# Patient Record
Sex: MALE | Race: BLACK OR AFRICAN AMERICAN
[De-identification: names, ages, dates, MRNs, and addresses within clinical notes are randomized per-mention and may not be internally consistent; named-entity substitution may affect disease eponyms.]

---

## 2020-09-11 ENCOUNTER — HOSPITAL ENCOUNTER (OUTPATIENT)
Age: 55
End: 2020-09-11
Payer: COMMERCIAL

## 2020-09-11 DIAGNOSIS — Z03.818: Primary | ICD-10-CM

## 2020-09-11 PROCEDURE — U0003 INFECTIOUS AGENT DETECTION BY NUCLEIC ACID (DNA OR RNA); SEVERE ACUTE RESPIRATORY SYNDROME CORONAVIRUS 2 (SARS-COV-2) (CORONAVIRUS DISEASE [COVID-19]), AMPLIFIED PROBE TECHNIQUE, MAKING USE OF HIGH THROUGHPUT TECHNOLOGIES AS DESCRIBED BY CMS-2020-01-R: HCPCS

## 2020-11-24 ENCOUNTER — HOSPITAL ENCOUNTER (OUTPATIENT)
Age: 55
End: 2020-11-24
Payer: COMMERCIAL

## 2020-11-24 DIAGNOSIS — M54.2: Primary | ICD-10-CM

## 2020-11-24 PROCEDURE — 72050 X-RAY EXAM NECK SPINE 4/5VWS: CPT

## 2020-11-25 ENCOUNTER — HOSPITAL ENCOUNTER (EMERGENCY)
Age: 55
Discharge: HOME | End: 2020-11-25
Payer: COMMERCIAL

## 2020-11-25 VITALS
BODY MASS INDEX: 39.3 KG/M2 | RESPIRATION RATE: 18 BRPM | HEART RATE: 130 BPM | SYSTOLIC BLOOD PRESSURE: 190 MMHG | TEMPERATURE: 97.88 F | DIASTOLIC BLOOD PRESSURE: 124 MMHG | OXYGEN SATURATION: 94 %

## 2020-11-25 VITALS
DIASTOLIC BLOOD PRESSURE: 124 MMHG | HEART RATE: 84 BPM | TEMPERATURE: 98.2 F | RESPIRATION RATE: 15 BRPM | SYSTOLIC BLOOD PRESSURE: 196 MMHG | OXYGEN SATURATION: 94 %

## 2020-11-25 VITALS — OXYGEN SATURATION: 94 % | DIASTOLIC BLOOD PRESSURE: 122 MMHG | SYSTOLIC BLOOD PRESSURE: 185 MMHG | HEART RATE: 85 BPM

## 2020-11-25 VITALS — OXYGEN SATURATION: 94 % | SYSTOLIC BLOOD PRESSURE: 185 MMHG | DIASTOLIC BLOOD PRESSURE: 118 MMHG | HEART RATE: 89 BPM

## 2020-11-25 DIAGNOSIS — F41.9: ICD-10-CM

## 2020-11-25 DIAGNOSIS — I16.0: Primary | ICD-10-CM

## 2020-11-25 DIAGNOSIS — Z12.5: ICD-10-CM

## 2020-11-25 DIAGNOSIS — G44.209: ICD-10-CM

## 2020-11-25 LAB
ALBUMIN LEVEL: 4.5 G/DL (ref 3.5–5)
ALBUMIN/GLOB SERPL: 1.4 {RATIO} (ref 1.1–1.8)
ALP ISO SERPL-ACNC: 71 U/L (ref 38–126)
ALT SERPLBLD-CCNC: 42 U/L (ref 12–78)
ANION GAP SERPL CALC-SCNC: 13.8 MEQ/L (ref 5–15)
AST SERPL QL: 31 U/L (ref 17–59)
BILIRUBIN,TOTAL: 0.5 MG/DL (ref 0.2–1.3)
BUN SERPL-MCNC: 18 MG/DL (ref 9–20)
CALCIUM SPEC-MCNC: 9.6 MG/DL (ref 8.4–10.2)
CHLORIDE SPEC-SCNC: 106 MMOL/L (ref 98–107)
CO2 SERPL-SCNC: 25 MMOL/L (ref 22–30)
COLOR UR: YELLOW
CREAT BLD-SCNC: 1.1 MG/DL (ref 0.66–1.25)
CREATININE CLEARANCE ESTIMATED: 141 ML/MIN (ref 50–200)
ESTIMATED GLOMERULAR FILT RATE: 69 ML/MIN (ref 60–?)
GFR (AFRICAN AMERICAN): 84 ML/MIN (ref 60–?)
GLOBULIN SER CALC-MCNC: 3.3 G/DL (ref 1.3–3.2)
GLUCOSE: 107 MG/DL (ref 74–100)
HCT VFR BLD CALC: 49.4 % (ref 42–52)
HGB BLD-MCNC: 16.6 G/DL (ref 14.1–18)
MCHC RBC-ENTMCNC: 33.5 G/DL (ref 31.8–35.4)
MCV RBC: 85.6 FL (ref 80–94)
MEAN CORPUSCULAR HEMOGLOBIN: 28.7 PG (ref 27–31.2)
MICRO URNS: (no result)
MUCOUS THREADS URNS QL MICRO: (no result) /LPF
PH UR: 6 [PH] (ref 5–8.5)
PLATELET # BLD: 380 K/MM3 (ref 142–424)
POTASSIUM: 4.8 MMOL/L (ref 3.5–5.1)
PROT SERPL-MCNC: 7.8 G/DL (ref 6.3–8.2)
PROT UR STRIP-MCNC: (no result) MG/DL
RBC # BLD AUTO: 5.77 M/MM3 (ref 4.6–6.2)
RBC #/AREA URNS HPF: (no result) #/HPF (ref 0–3)
SODIUM SPEC-SCNC: 140 MMOL/L (ref 136–145)
SP GR UR: >= 1.03 (ref 1–1.03)
UROBILINOGEN UR QL: 0.2 EU/DL
WBC # BLD AUTO: 9.8 K/MM3 (ref 4.8–10.8)

## 2020-11-25 PROCEDURE — 80053 COMPREHEN METABOLIC PANEL: CPT

## 2020-11-25 PROCEDURE — 81001 URINALYSIS AUTO W/SCOPE: CPT

## 2020-11-25 PROCEDURE — 99282 EMERGENCY DEPT VISIT SF MDM: CPT

## 2020-11-25 PROCEDURE — G0103 PSA SCREENING: HCPCS

## 2020-11-25 PROCEDURE — 85025 COMPLETE CBC W/AUTO DIFF WBC: CPT

## 2022-04-11 ENCOUNTER — TELEPHONE (OUTPATIENT)
Dept: FAMILY MEDICINE CLINIC | Facility: CLINIC | Age: 57
End: 2022-04-11

## 2022-04-11 RX ORDER — HYDROCHLOROTHIAZIDE 25 MG/1
25 TABLET ORAL DAILY
Qty: 90 TABLET | Refills: 0 | Status: SHIPPED | OUTPATIENT
Start: 2022-04-11

## 2022-04-11 RX ORDER — NABUMETONE 750 MG/1
750 TABLET, FILM COATED ORAL 2 TIMES DAILY
Qty: 180 TABLET | Refills: 0 | Status: SHIPPED | OUTPATIENT
Start: 2022-04-11

## 2022-04-11 NOTE — TELEPHONE ENCOUNTER
Caller: Moses Wiseman    Relationship:   Best call back number: [phone]    Requested Prescriptions:    HYDROCHLOROTHAZIDE 25MG 1 X DAY    NABUMETONE 750 MG 1 TABLET BY MOUTH TWICE DAILY         Pharmacy where request should be sent: [id number]  [place] - [address] [phone] [facility] [phone] FX     Additional details provided by patient: WOULD LIKE TO GET 90 DAY SUPPLY     Does the patient have less than a 3 day supply:  [x] Yes  [] No    Kami Hines MA   04/11/22 14:50 EDT

## 2023-01-01 ENCOUNTER — HOSPITAL ENCOUNTER (EMERGENCY)
Age: 58
Discharge: HOME | End: 2023-01-01
Payer: COMMERCIAL

## 2023-01-01 VITALS
RESPIRATION RATE: 22 BRPM | SYSTOLIC BLOOD PRESSURE: 148 MMHG | HEART RATE: 120 BPM | OXYGEN SATURATION: 94 % | DIASTOLIC BLOOD PRESSURE: 115 MMHG | TEMPERATURE: 99.5 F

## 2023-01-01 VITALS
HEART RATE: 120 BPM | RESPIRATION RATE: 22 BRPM | OXYGEN SATURATION: 94 % | DIASTOLIC BLOOD PRESSURE: 115 MMHG | SYSTOLIC BLOOD PRESSURE: 148 MMHG | TEMPERATURE: 99.5 F

## 2023-01-01 VITALS — BODY MASS INDEX: 40.5 KG/M2

## 2023-01-01 DIAGNOSIS — B97.29: ICD-10-CM

## 2023-01-01 DIAGNOSIS — J01.90: Primary | ICD-10-CM

## 2023-01-01 PROCEDURE — 87581 M.PNEUMON DNA AMP PROBE: CPT

## 2023-01-01 PROCEDURE — 87798 DETECT AGENT NOS DNA AMP: CPT

## 2023-01-01 PROCEDURE — 99212 OFFICE O/P EST SF 10 MIN: CPT

## 2023-01-01 PROCEDURE — G0463 HOSPITAL OUTPT CLINIC VISIT: HCPCS

## 2023-01-01 PROCEDURE — U0005 INFEC AGEN DETEC AMPLI PROBE: HCPCS

## 2023-01-01 PROCEDURE — C9803 HOPD COVID-19 SPEC COLLECT: HCPCS

## 2023-01-01 PROCEDURE — 87804 INFLUENZA ASSAY W/OPTIC: CPT

## 2023-01-01 PROCEDURE — 87632 RESP VIRUS 6-11 TARGETS: CPT

## 2023-01-01 PROCEDURE — 99213 OFFICE O/P EST LOW 20 MIN: CPT

## 2023-01-01 PROCEDURE — U0003 INFECTIOUS AGENT DETECTION BY NUCLEIC ACID (DNA OR RNA); SEVERE ACUTE RESPIRATORY SYNDROME CORONAVIRUS 2 (SARS-COV-2) (CORONAVIRUS DISEASE [COVID-19]), AMPLIFIED PROBE TECHNIQUE, MAKING USE OF HIGH THROUGHPUT TECHNOLOGIES AS DESCRIBED BY CMS-2020-01-R: HCPCS

## 2023-06-17 ENCOUNTER — HOSPITAL ENCOUNTER (EMERGENCY)
Age: 58
Discharge: HOME | End: 2023-06-17
Payer: COMMERCIAL

## 2023-06-17 VITALS — BODY MASS INDEX: 42 KG/M2

## 2023-06-17 VITALS
HEART RATE: 87 BPM | SYSTOLIC BLOOD PRESSURE: 150 MMHG | RESPIRATION RATE: 18 BRPM | OXYGEN SATURATION: 98 % | TEMPERATURE: 98.6 F | DIASTOLIC BLOOD PRESSURE: 93 MMHG

## 2023-06-17 VITALS
TEMPERATURE: 98.6 F | HEART RATE: 87 BPM | DIASTOLIC BLOOD PRESSURE: 93 MMHG | SYSTOLIC BLOOD PRESSURE: 150 MMHG | RESPIRATION RATE: 18 BRPM | OXYGEN SATURATION: 98 %

## 2023-06-17 DIAGNOSIS — H66.93: Primary | ICD-10-CM

## 2023-06-17 DIAGNOSIS — J45.909: ICD-10-CM

## 2023-06-17 DIAGNOSIS — I10: ICD-10-CM

## 2023-06-17 PROCEDURE — 99214 OFFICE O/P EST MOD 30 MIN: CPT

## 2023-06-17 PROCEDURE — G0463 HOSPITAL OUTPT CLINIC VISIT: HCPCS

## 2023-06-17 PROCEDURE — 99212 OFFICE O/P EST SF 10 MIN: CPT

## 2024-08-30 ENCOUNTER — PRE-ADMISSION TESTING (OUTPATIENT)
Dept: PREADMISSION TESTING | Facility: HOSPITAL | Age: 59
End: 2024-08-30
Payer: COMMERCIAL

## 2024-08-30 ENCOUNTER — HOSPITAL ENCOUNTER (OUTPATIENT)
Dept: GENERAL RADIOLOGY | Facility: HOSPITAL | Age: 59
Discharge: HOME | End: 2024-08-30
Payer: COMMERCIAL

## 2024-08-30 VITALS — WEIGHT: 315 LBS | HEIGHT: 72 IN | BODY MASS INDEX: 42.66 KG/M2

## 2024-08-30 DIAGNOSIS — C61 PROSTATE CANCER: ICD-10-CM

## 2024-08-30 LAB
ANION GAP SERPL CALCULATED.3IONS-SCNC: 9 MMOL/L (ref 5–15)
BILIRUB UR QL STRIP: NEGATIVE
BUN SERPL-MCNC: 14 MG/DL (ref 6–20)
BUN/CREAT SERPL: 14.9 (ref 7–25)
CALCIUM SPEC-SCNC: 9.2 MG/DL (ref 8.6–10.5)
CHLORIDE SERPL-SCNC: 101 MMOL/L (ref 98–107)
CLARITY UR: CLEAR
CO2 SERPL-SCNC: 28 MMOL/L (ref 22–29)
COLOR UR: YELLOW
CREAT SERPL-MCNC: 0.94 MG/DL (ref 0.76–1.27)
DEPRECATED RDW RBC AUTO: 44.5 FL (ref 37–54)
EGFRCR SERPLBLD CKD-EPI 2021: 94 ML/MIN/1.73
ERYTHROCYTE [DISTWIDTH] IN BLOOD BY AUTOMATED COUNT: 13.6 % (ref 12.3–15.4)
GLUCOSE SERPL-MCNC: 98 MG/DL (ref 65–99)
GLUCOSE UR STRIP-MCNC: NEGATIVE MG/DL
HBA1C MFR BLD: 6.3 % (ref 4.8–5.6)
HCT VFR BLD AUTO: 50.3 % (ref 37.5–51)
HGB BLD-MCNC: 15.7 G/DL (ref 13–17.7)
HGB UR QL STRIP.AUTO: NEGATIVE
KETONES UR QL STRIP: NEGATIVE
LEUKOCYTE ESTERASE UR QL STRIP.AUTO: NEGATIVE
MCH RBC QN AUTO: 27.6 PG (ref 26.6–33)
MCHC RBC AUTO-ENTMCNC: 31.2 G/DL (ref 31.5–35.7)
MCV RBC AUTO: 88.6 FL (ref 79–97)
NITRITE UR QL STRIP: NEGATIVE
PH UR STRIP.AUTO: 6.5 [PH] (ref 5–8)
PLATELET # BLD AUTO: 321 10*3/MM3 (ref 140–450)
PMV BLD AUTO: 8.5 FL (ref 6–12)
POTASSIUM SERPL-SCNC: 4.3 MMOL/L (ref 3.5–5.2)
PROT UR QL STRIP: NEGATIVE
QT INTERVAL: 358 MS
QTC INTERVAL: 423 MS
RBC # BLD AUTO: 5.68 10*6/MM3 (ref 4.14–5.8)
SODIUM SERPL-SCNC: 138 MMOL/L (ref 136–145)
SP GR UR STRIP: 1.03 (ref 1–1.03)
UROBILINOGEN UR QL STRIP: NORMAL
WBC NRBC COR # BLD AUTO: 6.8 10*3/MM3 (ref 3.4–10.8)

## 2024-08-30 PROCEDURE — 93005 ELECTROCARDIOGRAM TRACING: CPT

## 2024-08-30 PROCEDURE — 85027 COMPLETE CBC AUTOMATED: CPT

## 2024-08-30 PROCEDURE — 80048 BASIC METABOLIC PNL TOTAL CA: CPT

## 2024-08-30 PROCEDURE — 81003 URINALYSIS AUTO W/O SCOPE: CPT

## 2024-08-30 PROCEDURE — 71046 X-RAY EXAM CHEST 2 VIEWS: CPT

## 2024-08-30 PROCEDURE — 36415 COLL VENOUS BLD VENIPUNCTURE: CPT

## 2024-08-30 PROCEDURE — 83036 HEMOGLOBIN GLYCOSYLATED A1C: CPT

## 2024-08-30 RX ORDER — SODIUM PHOSPHATE,MONO-DIBASIC 19G-7G/118
2 ENEMA (ML) RECTAL DAILY
COMMUNITY

## 2024-08-30 RX ORDER — VALSARTAN 160 MG/1
160 TABLET ORAL DAILY
COMMUNITY

## 2024-08-30 RX ORDER — LORATADINE 10 MG/1
10 TABLET ORAL DAILY
COMMUNITY

## 2024-08-30 NOTE — PAT
Left message for Dr Bustos to return call to notify him of the patient's need for cardiac clearance.  NO response.  Left message for Mayra at Dr Bustos's office to return my call.  She returned my call at 1430.  I informed Mayra that Dr Rock has requested cardiac clearance due to abnormal EKG and risk factors of BMI, HTN, ROBIN (no cpap use), and poor activity tolerance. It is not possible to have the patient seen by cardiology before 9/3/24.  Patient will need to be cancelled.  Mayra to notify the patient on his cell phone that he will be cancelled. Patient had already left PAT.  Calls were not returned while patient was still in PAT.

## 2024-08-30 NOTE — PAT
Patient to apply Chlorhexadine wipes  to surgical area (as instructed) the night before procedure and the AM of procedure. Wipes provided.    Patient instructed to drink 20 ounces of Gatorade or Gatorlyte (if diabetic) and it needs to be completed 1 hour (for Main OR patients) or 2 hours (scheduled  section & [facility] patients) before given arrival time for procedure (NO RED Gatorade and NO Gatorade Zero).    Patient verbalized understanding.    Patient directed to Radiology Department for CXR after Pre Admission Testing Appointment.     Per Anesthesia Request, patient instructed not to take their ACE/ARB medications on the AM of surgery.    Living will added to chart.     Dr rodriguez reviewed EKG and states pt needs cardiac clearance before surgery- dr ariza paged multiple times and message left for his  to call pat back-  awaiting call at this time- holding chart at this time- pt informed to hear from to office. Pt verbalize understanding.

## 2024-09-09 ENCOUNTER — PATIENT ROUNDING (BHMG ONLY) (OUTPATIENT)
Dept: CARDIOLOGY | Facility: CLINIC | Age: 59
End: 2024-09-09

## 2024-09-09 ENCOUNTER — OFFICE VISIT (OUTPATIENT)
Dept: CARDIOLOGY | Facility: CLINIC | Age: 59
End: 2024-09-09
Payer: COMMERCIAL

## 2024-09-09 VITALS
WEIGHT: 315 LBS | OXYGEN SATURATION: 92 % | HEIGHT: 72 IN | SYSTOLIC BLOOD PRESSURE: 138 MMHG | DIASTOLIC BLOOD PRESSURE: 94 MMHG | HEART RATE: 89 BPM | BODY MASS INDEX: 42.66 KG/M2

## 2024-09-09 DIAGNOSIS — R07.9 CHRONIC CHEST PAIN WITH LOW TO MODERATE RISK FOR CAD: Primary | ICD-10-CM

## 2024-09-09 DIAGNOSIS — G89.29 CHRONIC CHEST PAIN WITH LOW TO MODERATE RISK FOR CAD: Primary | ICD-10-CM

## 2024-09-09 DIAGNOSIS — Z91.89 CHRONIC CHEST PAIN WITH LOW TO MODERATE RISK FOR CAD: Primary | ICD-10-CM

## 2024-09-09 PROCEDURE — 99203 OFFICE O/P NEW LOW 30 MIN: CPT | Performed by: PHYSICIAN ASSISTANT

## 2024-09-09 PROCEDURE — 93000 ELECTROCARDIOGRAM COMPLETE: CPT | Performed by: PHYSICIAN ASSISTANT

## 2024-09-09 NOTE — PROGRESS NOTES
[facility] at [facility]  INITIAL OFFICE CONSULT      Moses Wiseman  1965  PCP: Andrea Aragon, DO    SUBJECTIVE:   Moses Wiseman is a 58 y.o. male seen for a consultation visit regarding the following:     Chief Complaint:   Chief Complaint   Patient presents with    cardiac clearance          Consultation is requested by Andrea Aragon,* for evaluation of cardiac clearance      History:  Pleasant 58-year-old gentleman retired from [employer] presents today for cardiac evaluation regarding abnormal EKG and need for upcoming prostate surgery as well as pituitary adenoma removal.  The patient denies any previous cardiac history.  He does state he has been in the wear shape of his life for the past couple years.  He has gained a tremendous amount of weight he is now over 326 pounds with his average weight few years ago being 250 pound range.  He is quite short of breath with this has difficult time doing routine physical tasks as he is so short of breath he has to stop walking frequently.  He denies any palpitation denies any near-syncope or syncope.  He notes some chest tightness with shortness of breath.  He denies heart failure symptoms like orthopnea PND pedal edema.  He states his blood pressure is controlled.  He is unsure if his cholesterol is high he does know his glucose has been elevated recently.  His EKG was performed in preadmission testing last week prior to upcoming prostate surgery this revealed nonspecific ST atrial abnormality especially in the lateral leads.      Cardiac PMH: (Old records have been reviewed and summarized below)  Abnormal KEG  Non-functioning Pituitary macroadenoma Followed by Dr Hdez.   HTN: Controlled   OA with Knees.   Sleep Apnea , CPAP  Pre DM, HbA1c 6.2   Prostate Cancer: Plan for resection.         Past Medical History, Past Surgical History, Family history, Social History, and Medications were all reviewed with  the patient today and updated as necessary.     Current Outpatient Medications   Medication Sig Dispense Refill    glucosamine-chondroitin 500-400 MG capsule capsule Take 2 capsules by mouth Daily.      loratadine (CLARITIN) 10 MG tablet Take 1 tablet by mouth Daily.      nabumetone (RELAFEN) 750 MG tablet Take 1 tablet by mouth 2 (Two) Times a Day. 180 tablet 0    NON FORMULARY Nitric oxide-  2 tablets daily      valsartan (DIOVAN) 160 MG tablet Take 1 tablet by mouth Daily.       No current facility-administered medications for this visit.     No Known Allergies      Past Medical History:   Diagnosis Date    Arthritis     bilat knee    Asthma     allergy induced    Cancer     prostate    Headache     Hypertension     Pituitary tumor     will be removed after prostate surgery    Sleep apnea     no machine used    Tinnitus     Wears glasses      Past Surgical History:   Procedure Laterality Date    MULTIPLE TOOTH EXTRACTIONS      not wisdom tooth    PROSTATE BIOPSY       Family History   Problem Relation Age of Onset    Heart attack Mother     Prostate cancer Father     Multiple sclerosis Sister     Dementia Sister     Hyperlipidemia Brother     Prostate cancer Brother     Hypertension Brother      Social History     Tobacco Use    Smoking status: Never    Smokeless tobacco: Never   Substance Use Topics    Alcohol use: Yes     Comment: rarely       ROS:  Review of Symptoms:  General: Positive fatigue  Skin: no rashes, lumps, or other skin changes  HEENT: no dizziness, lightheadedness, or vision changes  Respiratory: no cough or hemoptysis  Cardiovascular: no palpitations, and tachycardia  Gastrointestinal: no black/tarry stools or diarrhea  Urinary: no change in frequency or urgency  Peripheral Vascular: no claudication or leg cramps  Musculoskeletal: no muscle or joint pain/stiffness  Psychiatric: no depression or excessive stress  Neurological: no sensory or motor loss, no syncope  Hematologic: no anemia, easy  "bruising or bleeding  Endocrine: no thyroid problems, nor heat or cold intolerance         PHYSICAL EXAM:   /94 (BP Location: Right arm, Patient Position: Sitting, Cuff Size: Adult)   Pulse 89   Ht 182.9 cm (72\")   Wt (!) 148 kg (326 lb 9.6 oz)   SpO2 92%   BMI 44.29 kg/m²      Wt Readings from Last 5 Encounters:   09/09/24 (!) 148 kg (326 lb 9.6 oz)   08/30/24 (!) 147 kg (324 lb 15.3 oz)     BP Readings from Last 5 Encounters:   09/09/24 138/94       General-Well Nourished, Well developed  Eyes - PERRLA  Neck- supple, No mass  CV- regular rate and rhythm, no MRG  Lung- clear bilaterally  Abd- soft, +BS  Musc/skel - Norm strength and range of motion  Skin- warm and dry  Neuro - Alert & Oriented x 3, appropriate mood.    Patient's external notes were reviewed.  Independent interpretation of test performed by another physician in facility were reviewed.  Outside laboratory data was also reviewed.    Medical problems and test results were reviewed with the patient today.     Results for orders placed or performed in visit on 08/30/24   Hemoglobin A1c    Specimen: Blood   Result Value Ref Range    Hemoglobin A1C 6.30 (H) 4.80 - 5.60 %   CBC (No Diff)    Specimen: Blood   Result Value Ref Range    WBC 6.80 3.40 - 10.80 10*3/mm3    RBC 5.68 4.14 - 5.80 10*6/mm3    Hemoglobin 15.7 13.0 - 17.7 g/dL    Hematocrit 50.3 37.5 - 51.0 %    MCV 88.6 79.0 - 97.0 fL    MCH 27.6 26.6 - 33.0 pg    MCHC 31.2 (L) 31.5 - 35.7 g/dL    RDW 13.6 12.3 - 15.4 %    RDW-SD 44.5 37.0 - 54.0 fl    MPV 8.5 6.0 - 12.0 fL    Platelets 321 140 - 450 10*3/mm3   Basic Metabolic Panel    Specimen: Blood   Result Value Ref Range    Glucose 98 65 - 99 mg/dL    BUN 14 6 - 20 mg/dL    Creatinine 0.94 0.76 - 1.27 mg/dL    Sodium 138 136 - 145 mmol/L    Potassium 4.3 3.5 - 5.2 mmol/L    Chloride 101 98 - 107 mmol/L    CO2 28.0 22.0 - 29.0 mmol/L    Calcium 9.2 8.6 - 10.5 mg/dL    BUN/Creatinine Ratio 14.9 7.0 - 25.0    Anion Gap 9.0 5.0 - 15.0 " "mmol/L    eGFR 94.0 >60.0 mL/min/1.73   Urinalysis without microscopic (no culture) - Urine, Clean Catch    Specimen: Urine, Clean Catch   Result Value Ref Range    Color, UA Yellow Yellow, Straw    Appearance, UA Clear Clear    pH, UA 6.5 5.0 - 8.0    Specific Gravity, UA 1.027 1.001 - 1.030    Glucose, UA Negative Negative    Ketones, UA Negative Negative    Bilirubin, UA Negative Negative    Blood, UA Negative Negative    Protein, UA Negative Negative    Leuk Esterase, UA Negative Negative    Nitrite, UA Negative Negative    Urobilinogen, UA 1.0 E.U./dL 0.2 - 1.0 E.U./dL   ECG 12 Lead   Result Value Ref Range    QT Interval 358 ms    QTC Interval 423 ms         No results found for: \"CHOL\", \"HDL\", \"HDLC\", \"LDL\", \"LDLC\", \"VLDL\"    EKG:  (EKG/Tracing has been independently visualized by me and summarized below)      ECG 12 Lead    Date/Time: 9/9/2024 9:18 AM  Performed by: Moses Biswas PA    Authorized by: Alex Bustos Jr., MD  Comparison: not compared with previous ECG   Rhythm: sinus rhythm  Rate: normal  Conduction: conduction normal  ST Segments: ST segments normal  QRS axis: normal  Other findings: T wave abnormality    Clinical impression: non-specific ECG          ASSESSMENT   1. Abnormal EKG- Nonspecific ST-T abnormalties.  Dyspnea exertion could be Angina equivalent    2. HTN: Diovan    3. Obesity, Weight 326lbs    4. ROBIN, noncompliance with CPAP     5. Pre DM     6.  Prostate cancer chronic surgical revision and upcoming surgical revision of pituitary adenoma    7.  Cholesterol status unknown      PLAN  GXT stress test, PET scan  Echocardiogram    Cardiology/Electrophysiology  09/09/24  09:42 EDT  Electronically signed by CLARY Garcia, 09/09/24, 9:18 AM EDT.   "

## 2024-09-09 NOTE — PROGRESS NOTES
September 9, 2024    Hello, may I speak with Moses Wiseman?    My name is ISAC      I am  with [facility]  [address]  [address]  [place] [address] [phone].    Before we get started may I verify your date of birth? 1965    I am calling to officially welcome you to our practice and ask about your recent visit. Is this a good time to talk? yes    Tell me about your visit with us. What things went well?  EVERYTHIN. GETTING SURGERY CLEARANCE        We're always looking for ways to make our patients' experiences even better. Do you have recommendations on ways we may improve?  no    Overall were you satisfied with your first visit to our practice? yes       I appreciate you taking the time to speak with me today. Is there anything else I can do for you? no      Thank you, and have a great day.

## 2024-09-11 ENCOUNTER — HOSPITAL ENCOUNTER (OUTPATIENT)
Dept: CARDIOLOGY | Facility: HOSPITAL | Age: 59
Discharge: HOME | End: 2024-09-11
Payer: COMMERCIAL

## 2024-09-11 VITALS — BODY MASS INDEX: 42.66 KG/M2 | WEIGHT: 315 LBS | HEIGHT: 72 IN

## 2024-09-11 DIAGNOSIS — R07.9 CHRONIC CHEST PAIN WITH LOW TO MODERATE RISK FOR CAD: ICD-10-CM

## 2024-09-11 DIAGNOSIS — Z91.89 CHRONIC CHEST PAIN WITH LOW TO MODERATE RISK FOR CAD: ICD-10-CM

## 2024-09-11 DIAGNOSIS — G89.29 CHRONIC CHEST PAIN WITH LOW TO MODERATE RISK FOR CAD: ICD-10-CM

## 2024-09-11 LAB
ASCENDING AORTA: 4.2 CM
BH CV ECHO LEFT VENTRICLE GLOBAL LONGITUDINAL STRAIN: -25 %
BH CV ECHO MEAS - AO MAX PG: 5.8 MMHG
BH CV ECHO MEAS - AO MEAN PG: 4 MMHG
BH CV ECHO MEAS - AO ROOT DIAM: 4.3 CM
BH CV ECHO MEAS - AO V2 MAX: 120.4 CM/SEC
BH CV ECHO MEAS - AO V2 VTI: 19.1 CM
BH CV ECHO MEAS - AVA(I,D): 2.8 CM2
BH CV ECHO MEAS - CONTRAST EF 4CH: 64.5 CM2
BH CV ECHO MEAS - EF(MOD-BP): 64.5 %
BH CV ECHO MEAS - EF(MOD-SP2): 65.6 %
BH CV ECHO MEAS - EF(MOD-SP4): 64 %
BH CV ECHO MEAS - IVS/LVPW: 1 CM
BH CV ECHO MEAS - IVSD: 1.6 CM
BH CV ECHO MEAS - LA DIMENSION: 3.8 CM
BH CV ECHO MEAS - LAT PEAK E' VEL: 10 CM/SEC
BH CV ECHO MEAS - LV MAX PG: 4.8 MMHG
BH CV ECHO MEAS - LV MEAN PG: 2.4 MMHG
BH CV ECHO MEAS - LV V1 MAX: 109.5 CM/SEC
BH CV ECHO MEAS - LV V1 VTI: 23.3 CM
BH CV ECHO MEAS - LVIDD: 3 CM
BH CV ECHO MEAS - LVIDS: 2 CM
BH CV ECHO MEAS - LVOT AREA: 3.1 CM2
BH CV ECHO MEAS - LVOT DIAM: 2 CM
BH CV ECHO MEAS - LVPWD: 1.6 CM
BH CV ECHO MEAS - MED PEAK E' VEL: 7.7 CM/SEC
BH CV ECHO MEAS - MV A MAX VEL: 83.8 CM/SEC
BH CV ECHO MEAS - MV E MAX VEL: 92.3 CM/SEC
BH CV ECHO MEAS - MV E/A: 1.1
BH CV ECHO MEAS - MV MAX PG: 3.7 MMHG
BH CV ECHO MEAS - MV MEAN PG: 1.2 MMHG
BH CV ECHO MEAS - MV P1/2T: 55 MSEC
BH CV ECHO MEAS - MV V2 VTI: 23.6 CM
BH CV ECHO MEAS - MVA(P1/2T): 4 CM2
BH CV ECHO MEAS - PA ACC TIME: 0.14 SEC
BH CV ECHO MEAS - PA V2 MAX: 93.9 CM/SEC
BH CV ECHO MEAS - TAPSE (>1.6): 1.61 CM
BH CV ECHO MEASUREMENTS AVERAGE E/E' RATIO: 10.43
BH CV REST NUCLEAR ISOTOPE DOSE: 30 MCI
BH CV STRESS BP STAGE 1: NORMAL
BH CV STRESS BP STAGE 3: NORMAL
BH CV STRESS COMMENTS STAGE 1: NORMAL
BH CV STRESS DOSE REGADENOSON STAGE 1: 0.4
BH CV STRESS DURATION MIN STAGE 1: 1
BH CV STRESS DURATION MIN STAGE 2: 1
BH CV STRESS DURATION MIN STAGE 3: 1
BH CV STRESS DURATION MIN STAGE 4: 1
BH CV STRESS DURATION SEC STAGE 1: 0
BH CV STRESS DURATION SEC STAGE 2: 0
BH CV STRESS DURATION SEC STAGE 3: 0
BH CV STRESS DURATION SEC STAGE 4: 0
BH CV STRESS HR STAGE 1: 90
BH CV STRESS HR STAGE 2: 99
BH CV STRESS HR STAGE 3: 96
BH CV STRESS HR STAGE 4: 92
BH CV STRESS NUCLEAR ISOTOPE DOSE: 30 MCI
BH CV STRESS O2 STAGE 1: 94
BH CV STRESS O2 STAGE 2: 97
BH CV STRESS O2 STAGE 3: 96
BH CV STRESS O2 STAGE 4: 95
BH CV STRESS PROTOCOL 1: NORMAL
BH CV STRESS RECOVERY BP: NORMAL MMHG
BH CV STRESS RECOVERY HR: 86 BPM
BH CV STRESS RECOVERY O2: 95 %
BH CV STRESS STAGE 1: 1
BH CV STRESS STAGE 2: 2
BH CV STRESS STAGE 3: 3
BH CV STRESS STAGE 4: 4
BH CV XLRA - RV BASE: 5.1 CM
BH CV XLRA - RV LENGTH: 8.3 CM
BH CV XLRA - RV MID: 4 CM
BH CV XLRA - TDI S': 12.3 CM/SEC
LV EF NUC BP: 56 %
MAXIMAL PREDICTED HEART RATE: 162 BPM
PERCENT MAX PREDICTED HR: 63.58 %
STRESS BASELINE BP: NORMAL MMHG
STRESS BASELINE HR: 83 BPM
STRESS O2 SAT REST: 93 %
STRESS PERCENT HR: 75 %
STRESS POST ESTIMATED WORKLOAD: 1 METS
STRESS POST EXERCISE DUR MIN: 4 MIN
STRESS POST EXERCISE DUR SEC: 0 SEC
STRESS POST O2 SAT PEAK: 97 %
STRESS POST PEAK BP: NORMAL MMHG
STRESS POST PEAK HR: 103 BPM
STRESS TARGET HR: 138 BPM

## 2024-09-11 PROCEDURE — A9555 RB82 RUBIDIUM: HCPCS | Performed by: PHYSICIAN ASSISTANT

## 2024-09-11 PROCEDURE — 93018 CV STRESS TEST I&R ONLY: CPT | Performed by: INTERNAL MEDICINE

## 2024-09-11 PROCEDURE — 93306 TTE W/DOPPLER COMPLETE: CPT

## 2024-09-11 PROCEDURE — 0 RUBIDIUM CHLORIDE: Performed by: PHYSICIAN ASSISTANT

## 2024-09-11 PROCEDURE — 93356 MYOCRD STRAIN IMG SPCKL TRCK: CPT

## 2024-09-11 PROCEDURE — 78431 MYOCRD IMG PET RST&STRS CT: CPT | Performed by: INTERNAL MEDICINE

## 2024-09-11 PROCEDURE — 25010000002 REGADENOSON 0.4 MG/5ML SOLUTION: Performed by: PHYSICIAN ASSISTANT

## 2024-09-11 PROCEDURE — 93017 CV STRESS TEST TRACING ONLY: CPT

## 2024-09-11 PROCEDURE — 25010000002 SULFUR HEXAFLUORIDE MICROSPH 60.7-25 MG RECONSTITUTED SUSPENSION: Performed by: PHYSICIAN ASSISTANT

## 2024-09-11 PROCEDURE — 78431 MYOCRD IMG PET RST&STRS CT: CPT

## 2024-09-11 RX ORDER — REGADENOSON 0.08 MG/ML
0.4 INJECTION, SOLUTION INTRAVENOUS ONCE
Status: COMPLETED | OUTPATIENT
Start: 2024-09-11 | End: 2024-09-11

## 2024-09-11 RX ADMIN — SULFUR HEXAFLUORIDE 5 ML: KIT at 10:05

## 2024-09-11 RX ADMIN — RUBIDIUM CHLORIDE RB-82 1 DOSE: 150 INJECTION, SOLUTION INTRAVENOUS at 08:46

## 2024-09-11 RX ADMIN — REGADENOSON 0.4 MG: 0.08 INJECTION, SOLUTION INTRAVENOUS at 08:44

## 2024-09-11 RX ADMIN — RUBIDIUM CHLORIDE RB-82 1 DOSE: 150 INJECTION, SOLUTION INTRAVENOUS at 08:34

## 2024-09-25 ENCOUNTER — DOCUMENTATION (OUTPATIENT)
Dept: CARDIOLOGY | Facility: CLINIC | Age: 59
End: 2024-09-25
Payer: COMMERCIAL

## 2024-10-22 ENCOUNTER — PRE-ADMISSION TESTING (OUTPATIENT)
Dept: PREADMISSION TESTING | Facility: HOSPITAL | Age: 59
End: 2024-10-22
Payer: COMMERCIAL

## 2024-10-22 VITALS — BODY MASS INDEX: 42.66 KG/M2 | WEIGHT: 315 LBS | HEIGHT: 72 IN

## 2024-10-22 LAB
ANION GAP SERPL CALCULATED.3IONS-SCNC: 10 MMOL/L (ref 5–15)
BILIRUB UR QL STRIP: NEGATIVE
BUN SERPL-MCNC: 12 MG/DL (ref 6–20)
BUN/CREAT SERPL: 13.5 (ref 7–25)
CALCIUM SPEC-SCNC: 9.2 MG/DL (ref 8.6–10.5)
CHLORIDE SERPL-SCNC: 102 MMOL/L (ref 98–107)
CLARITY UR: CLEAR
CO2 SERPL-SCNC: 28 MMOL/L (ref 22–29)
COLOR UR: YELLOW
CREAT SERPL-MCNC: 0.89 MG/DL (ref 0.76–1.27)
DEPRECATED RDW RBC AUTO: 45.1 FL (ref 37–54)
EGFRCR SERPLBLD CKD-EPI 2021: 98.7 ML/MIN/1.73
ERYTHROCYTE [DISTWIDTH] IN BLOOD BY AUTOMATED COUNT: 14.2 % (ref 12.3–15.4)
GLUCOSE SERPL-MCNC: 98 MG/DL (ref 65–99)
GLUCOSE UR STRIP-MCNC: NEGATIVE MG/DL
HBA1C MFR BLD: 6.4 % (ref 4.8–5.6)
HCT VFR BLD AUTO: 50.5 % (ref 37.5–51)
HGB BLD-MCNC: 15.9 G/DL (ref 13–17.7)
HGB UR QL STRIP.AUTO: NEGATIVE
KETONES UR QL STRIP: NEGATIVE
LEUKOCYTE ESTERASE UR QL STRIP.AUTO: NEGATIVE
MCH RBC QN AUTO: 27.9 PG (ref 26.6–33)
MCHC RBC AUTO-ENTMCNC: 31.5 G/DL (ref 31.5–35.7)
MCV RBC AUTO: 88.8 FL (ref 79–97)
NITRITE UR QL STRIP: NEGATIVE
PH UR STRIP.AUTO: 6.5 [PH] (ref 5–8)
PLATELET # BLD AUTO: 344 10*3/MM3 (ref 140–450)
PMV BLD AUTO: 8.5 FL (ref 6–12)
POTASSIUM SERPL-SCNC: 4.3 MMOL/L (ref 3.5–5.2)
PROT UR QL STRIP: ABNORMAL
RBC # BLD AUTO: 5.69 10*6/MM3 (ref 4.14–5.8)
SODIUM SERPL-SCNC: 140 MMOL/L (ref 136–145)
SP GR UR STRIP: 1.02 (ref 1–1.03)
UROBILINOGEN UR QL STRIP: ABNORMAL
WBC NRBC COR # BLD AUTO: 7.39 10*3/MM3 (ref 3.4–10.8)

## 2024-10-22 PROCEDURE — 80048 BASIC METABOLIC PNL TOTAL CA: CPT

## 2024-10-22 PROCEDURE — 36415 COLL VENOUS BLD VENIPUNCTURE: CPT

## 2024-10-22 PROCEDURE — 85027 COMPLETE CBC AUTOMATED: CPT

## 2024-10-22 PROCEDURE — 83036 HEMOGLOBIN GLYCOSYLATED A1C: CPT

## 2024-10-22 PROCEDURE — 81003 URINALYSIS AUTO W/O SCOPE: CPT

## 2024-10-22 NOTE — PAT
An arrival time for procedure was not provided during PAT visit. If patient had any questions or concerns about their arrival time, they were instructed to contact their surgeon/physician.  Additionally, if the patient referred to an arrival time that was acquired from their my chart account, patient was encouraged to verify that time with their surgeon/physician. Arrival times are NOT provided in Pre Admission Testing Department.    Patient to apply Chlorhexadine wipes  to surgical area (as instructed) the night before procedure and the AM of procedure. Wipes provided.    Patient instructed to drink 20 ounces of Gatorade or Gatorlyte (if diabetic) and it needs to be completed 1 hour (for Main OR patients) or 2 hours (scheduled  section & [facility] patients) before given arrival time for procedure (NO RED Gatorade and NO Gatorade Zero).    Patient verbalized understanding.    Patient viewed general PAT education video as instructed in their preoperative information received from their surgeon.  Patient stated the general PAT education video was viewed in its entirety and survey completed.  Copies of PAT general education handouts (Incentive Spirometry, Meds to Beds Program, Patient Belongings, Pre-op skin preparation instructions, Blood Glucose testing, Visitor policy, Surgery FAQ, Code H) distributed to patient if not printed. Education related to the PAT pass and skin preparation for surgery (if applicable) completed in PAT as a reinforcement to PAT education video. Patient instructed to return PAT pass provided today as well as completed skin preparation sheet (if applicable) on the day of procedure.     Additionally if patient had not viewed video yet but intended to view it at home or in our waiting area, then referred them to the handout with QR code/link provided during PAT visit.  Encouraged patient/family to read PAT general education handouts thoroughly and notify PAT staff with any questions or  concerns. Patient verbalized understanding of all information and priority content.    Cardiac clearance and EKG in chart.

## 2024-10-28 ENCOUNTER — ANESTHESIA EVENT (OUTPATIENT)
Dept: PERIOP | Facility: HOSPITAL | Age: 59
End: 2024-10-28
Payer: COMMERCIAL

## 2024-10-28 RX ORDER — SODIUM CHLORIDE 0.9 % (FLUSH) 0.9 %
10 SYRINGE (ML) INJECTION EVERY 12 HOURS SCHEDULED
Status: CANCELLED | OUTPATIENT
Start: 2024-10-28

## 2024-10-28 RX ORDER — FAMOTIDINE 10 MG/ML
20 INJECTION, SOLUTION INTRAVENOUS ONCE
Status: CANCELLED | OUTPATIENT
Start: 2024-10-28 | End: 2024-10-28

## 2024-10-28 RX ORDER — SODIUM CHLORIDE 0.9 % (FLUSH) 0.9 %
10 SYRINGE (ML) INJECTION AS NEEDED
Status: CANCELLED | OUTPATIENT
Start: 2024-10-28

## 2024-10-29 ENCOUNTER — ANESTHESIA (OUTPATIENT)
Dept: PERIOP | Facility: HOSPITAL | Age: 59
End: 2024-10-29
Payer: COMMERCIAL

## 2024-10-29 ENCOUNTER — HOSPITAL ENCOUNTER (OUTPATIENT)
Facility: HOSPITAL | Age: 59
Discharge: HOME | End: 2024-11-01
Attending: UROLOGY | Admitting: UROLOGY
Payer: COMMERCIAL

## 2024-10-29 ENCOUNTER — ANESTHESIA EVENT CONVERTED (OUTPATIENT)
Dept: ANESTHESIOLOGY | Facility: HOSPITAL | Age: 59
End: 2024-10-29
Payer: COMMERCIAL

## 2024-10-29 DIAGNOSIS — C61 PROSTATE CANCER: Primary | ICD-10-CM

## 2024-10-29 DIAGNOSIS — E66.01 MORBID OBESITY: ICD-10-CM

## 2024-10-29 PROBLEM — I10 HTN (HYPERTENSION): Status: ACTIVE | Noted: 2024-10-29

## 2024-10-29 PROBLEM — G47.33 OSA (OBSTRUCTIVE SLEEP APNEA): Status: ACTIVE | Noted: 2024-10-29

## 2024-10-29 PROBLEM — Z90.79 S/P PROSTATECTOMY: Status: ACTIVE | Noted: 2024-10-29

## 2024-10-29 PROCEDURE — 25010000002 DEXAMETHASONE PER 1 MG

## 2024-10-29 PROCEDURE — 25010000002 DEXAMETHASONE SODIUM PHOSPHATE 10 MG/ML SOLUTION

## 2024-10-29 PROCEDURE — 25010000002 FENTANYL CITRATE (PF) 50 MCG/ML SOLUTION

## 2024-10-29 PROCEDURE — 25810000003 LACTATED RINGERS PER 1000 ML: Performed by: STUDENT IN AN ORGANIZED HEALTH CARE EDUCATION/TRAINING PROGRAM

## 2024-10-29 PROCEDURE — 25010000002 LABETALOL 5 MG/ML SOLUTION

## 2024-10-29 PROCEDURE — 88307 TISSUE EXAM BY PATHOLOGIST: CPT | Performed by: UROLOGY

## 2024-10-29 PROCEDURE — 94799 UNLISTED PULMONARY SVC/PX: CPT

## 2024-10-29 PROCEDURE — 88309 TISSUE EXAM BY PATHOLOGIST: CPT | Performed by: UROLOGY

## 2024-10-29 PROCEDURE — G0378 HOSPITAL OBSERVATION PER HR: HCPCS

## 2024-10-29 PROCEDURE — 25010000002 FENTANYL CITRATE (PF) 100 MCG/2ML SOLUTION

## 2024-10-29 PROCEDURE — 25010000002 LIDOCAINE PF 1% 1 % SOLUTION: Performed by: STUDENT IN AN ORGANIZED HEALTH CARE EDUCATION/TRAINING PROGRAM

## 2024-10-29 PROCEDURE — 63710000001 ONDANSETRON ODT 4 MG TABLET DISPERSIBLE: Performed by: UROLOGY

## 2024-10-29 PROCEDURE — 25010000002 LABETALOL 5 MG/ML SOLUTION: Performed by: INTERNAL MEDICINE

## 2024-10-29 PROCEDURE — 94660 CPAP INITIATION&MGMT: CPT

## 2024-10-29 PROCEDURE — 25010000002 CEFOXITIN PER 1 G: Performed by: UROLOGY

## 2024-10-29 PROCEDURE — 25010000002 HYDROMORPHONE 1 MG/ML SOLUTION

## 2024-10-29 PROCEDURE — 25010000002 SUGAMMADEX 500 MG/5ML SOLUTION

## 2024-10-29 PROCEDURE — 25010000002 ONDANSETRON PER 1 MG

## 2024-10-29 PROCEDURE — 25010000002 MIDAZOLAM PER 1 MG: Performed by: STUDENT IN AN ORGANIZED HEALTH CARE EDUCATION/TRAINING PROGRAM

## 2024-10-29 PROCEDURE — 25810000003 SODIUM CHLORIDE PER 500 ML: Performed by: UROLOGY

## 2024-10-29 PROCEDURE — 25010000002 PROPOFOL 10 MG/ML EMULSION

## 2024-10-29 PROCEDURE — 25010000002 LIDOCAINE PF 1% 1 % SOLUTION

## 2024-10-29 PROCEDURE — 25010000002 SODIUM CHLORIDE 0.9 % WITH KCL 20 MEQ 20-0.9 MEQ/L-% SOLUTION: Performed by: UROLOGY

## 2024-10-29 PROCEDURE — 25010000002 BUPIVACAINE (PF) 0.25 % SOLUTION

## 2024-10-29 DEVICE — GRFT AMNIO UMB CORD PRO3C THICK 3X6CM: Type: IMPLANTABLE DEVICE | Site: PROSTATE | Status: FUNCTIONAL

## 2024-10-29 RX ORDER — HYDROMORPHONE HYDROCHLORIDE 1 MG/ML
0.5 INJECTION, SOLUTION INTRAMUSCULAR; INTRAVENOUS; SUBCUTANEOUS
Status: DISCONTINUED | OUTPATIENT
Start: 2024-10-29 | End: 2024-11-01 | Stop reason: HOSPADM

## 2024-10-29 RX ORDER — HYDROMORPHONE HYDROCHLORIDE 1 MG/ML
0.5 INJECTION, SOLUTION INTRAMUSCULAR; INTRAVENOUS; SUBCUTANEOUS
Status: DISCONTINUED | OUTPATIENT
Start: 2024-10-29 | End: 2024-10-29 | Stop reason: HOSPADM

## 2024-10-29 RX ORDER — PROPOFOL 10 MG/ML
VIAL (ML) INTRAVENOUS AS NEEDED
Status: DISCONTINUED | OUTPATIENT
Start: 2024-10-29 | End: 2024-10-29 | Stop reason: SURG

## 2024-10-29 RX ORDER — GABAPENTIN 300 MG/1
600 CAPSULE ORAL ONCE
Status: COMPLETED | OUTPATIENT
Start: 2024-10-29 | End: 2024-10-29

## 2024-10-29 RX ORDER — MIDAZOLAM HYDROCHLORIDE 1 MG/ML
1 INJECTION, SOLUTION INTRAMUSCULAR; INTRAVENOUS
Status: DISCONTINUED | OUTPATIENT
Start: 2024-10-29 | End: 2024-10-29 | Stop reason: HOSPADM

## 2024-10-29 RX ORDER — SCOLOPAMINE TRANSDERMAL SYSTEM 1 MG/1
1 PATCH, EXTENDED RELEASE TRANSDERMAL CONTINUOUS
Status: DISPENSED | OUTPATIENT
Start: 2024-10-29 | End: 2024-11-01

## 2024-10-29 RX ORDER — DEXAMETHASONE SODIUM PHOSPHATE 4 MG/ML
INJECTION, SOLUTION INTRA-ARTICULAR; INTRALESIONAL; INTRAMUSCULAR; INTRAVENOUS; SOFT TISSUE AS NEEDED
Status: DISCONTINUED | OUTPATIENT
Start: 2024-10-29 | End: 2024-10-29 | Stop reason: SURG

## 2024-10-29 RX ORDER — LABETALOL HYDROCHLORIDE 5 MG/ML
10 INJECTION, SOLUTION INTRAVENOUS ONCE
Status: COMPLETED | OUTPATIENT
Start: 2024-10-29 | End: 2024-10-29

## 2024-10-29 RX ORDER — OXYCODONE HYDROCHLORIDE 5 MG/1
5 TABLET ORAL EVERY 4 HOURS PRN
Status: DISCONTINUED | OUTPATIENT
Start: 2024-10-29 | End: 2024-11-01 | Stop reason: HOSPADM

## 2024-10-29 RX ORDER — LABETALOL HYDROCHLORIDE 5 MG/ML
INJECTION, SOLUTION INTRAVENOUS
Status: COMPLETED
Start: 2024-10-29 | End: 2024-10-29

## 2024-10-29 RX ORDER — POLYETHYLENE GLYCOL 3350 17 G/17G
17 POWDER, FOR SOLUTION ORAL DAILY
Qty: 30 PACKET | Refills: 0 | Status: SHIPPED | OUTPATIENT
Start: 2024-10-29

## 2024-10-29 RX ORDER — FENTANYL CITRATE 50 UG/ML
INJECTION, SOLUTION INTRAMUSCULAR; INTRAVENOUS AS NEEDED
Status: DISCONTINUED | OUTPATIENT
Start: 2024-10-29 | End: 2024-10-29 | Stop reason: SURG

## 2024-10-29 RX ORDER — DOCUSATE SODIUM 100 MG/1
100 CAPSULE, LIQUID FILLED ORAL DAILY PRN
Qty: 30 CAPSULE | Refills: 1 | Status: SHIPPED | OUTPATIENT
Start: 2024-10-29 | End: 2025-10-29

## 2024-10-29 RX ORDER — DROPERIDOL 2.5 MG/ML
0.62 INJECTION, SOLUTION INTRAMUSCULAR; INTRAVENOUS ONCE AS NEEDED
Status: DISCONTINUED | OUTPATIENT
Start: 2024-10-29 | End: 2024-10-29 | Stop reason: HOSPADM

## 2024-10-29 RX ORDER — NITROFURANTOIN 25; 75 MG/1; MG/1
100 CAPSULE ORAL 2 TIMES DAILY
Qty: 14 CAPSULE | Refills: 0 | Status: SHIPPED | OUTPATIENT
Start: 2024-10-29

## 2024-10-29 RX ORDER — ONDANSETRON 4 MG/1
4 TABLET, ORALLY DISINTEGRATING ORAL EVERY 6 HOURS PRN
Status: DISCONTINUED | OUTPATIENT
Start: 2024-10-29 | End: 2024-11-01 | Stop reason: HOSPADM

## 2024-10-29 RX ORDER — FLUTICASONE PROPIONATE 50 MCG
2 SPRAY, SUSPENSION (ML) NASAL DAILY
COMMUNITY

## 2024-10-29 RX ORDER — LIDOCAINE HYDROCHLORIDE 10 MG/ML
0.5 INJECTION, SOLUTION EPIDURAL; INFILTRATION; INTRACAUDAL; PERINEURAL ONCE AS NEEDED
Status: COMPLETED | OUTPATIENT
Start: 2024-10-29 | End: 2024-10-29

## 2024-10-29 RX ORDER — FAMOTIDINE 20 MG/1
20 TABLET, FILM COATED ORAL ONCE
Status: COMPLETED | OUTPATIENT
Start: 2024-10-29 | End: 2024-10-29

## 2024-10-29 RX ORDER — LIDOCAINE HYDROCHLORIDE 10 MG/ML
INJECTION, SOLUTION EPIDURAL; INFILTRATION; INTRACAUDAL; PERINEURAL AS NEEDED
Status: DISCONTINUED | OUTPATIENT
Start: 2024-10-29 | End: 2024-10-29 | Stop reason: SURG

## 2024-10-29 RX ORDER — FENTANYL CITRATE 50 UG/ML
50 INJECTION, SOLUTION INTRAMUSCULAR; INTRAVENOUS
Status: DISCONTINUED | OUTPATIENT
Start: 2024-10-29 | End: 2024-10-29 | Stop reason: HOSPADM

## 2024-10-29 RX ORDER — SODIUM CHLORIDE AND POTASSIUM CHLORIDE 150; 900 MG/100ML; MG/100ML
100 INJECTION, SOLUTION INTRAVENOUS CONTINUOUS
Status: DISCONTINUED | OUTPATIENT
Start: 2024-10-29 | End: 2024-10-31

## 2024-10-29 RX ORDER — ONDANSETRON 2 MG/ML
INJECTION INTRAMUSCULAR; INTRAVENOUS AS NEEDED
Status: DISCONTINUED | OUTPATIENT
Start: 2024-10-29 | End: 2024-10-29 | Stop reason: SURG

## 2024-10-29 RX ORDER — PANTOPRAZOLE SODIUM 40 MG/1
40 TABLET, DELAYED RELEASE ORAL
Status: DISCONTINUED | OUTPATIENT
Start: 2024-10-30 | End: 2024-11-01 | Stop reason: HOSPADM

## 2024-10-29 RX ORDER — LABETALOL HYDROCHLORIDE 5 MG/ML
10 INJECTION, SOLUTION INTRAVENOUS EVERY 4 HOURS PRN
Status: DISCONTINUED | OUTPATIENT
Start: 2024-10-29 | End: 2024-11-01 | Stop reason: HOSPADM

## 2024-10-29 RX ORDER — BUPIVACAINE HYDROCHLORIDE 2.5 MG/ML
INJECTION, SOLUTION EPIDURAL; INFILTRATION; INTRACAUDAL
Status: COMPLETED | OUTPATIENT
Start: 2024-10-29 | End: 2024-10-29

## 2024-10-29 RX ORDER — VALSARTAN 160 MG/1
160 TABLET ORAL DAILY
Status: DISCONTINUED | OUTPATIENT
Start: 2024-10-29 | End: 2024-11-01 | Stop reason: HOSPADM

## 2024-10-29 RX ORDER — ACETAMINOPHEN 160 MG/5ML
650 SOLUTION ORAL EVERY 6 HOURS
Status: ACTIVE | OUTPATIENT
Start: 2024-10-29 | End: 2024-10-31

## 2024-10-29 RX ORDER — CETIRIZINE HYDROCHLORIDE 10 MG/1
5 TABLET ORAL DAILY
Status: DISCONTINUED | OUTPATIENT
Start: 2024-10-29 | End: 2024-11-01 | Stop reason: HOSPADM

## 2024-10-29 RX ORDER — ENOXAPARIN SODIUM 100 MG/ML
40 INJECTION SUBCUTANEOUS EVERY 12 HOURS
Status: DISCONTINUED | OUTPATIENT
Start: 2024-10-30 | End: 2024-11-01 | Stop reason: HOSPADM

## 2024-10-29 RX ORDER — ONDANSETRON 2 MG/ML
4 INJECTION INTRAMUSCULAR; INTRAVENOUS EVERY 6 HOURS PRN
Status: DISCONTINUED | OUTPATIENT
Start: 2024-10-29 | End: 2024-11-01 | Stop reason: HOSPADM

## 2024-10-29 RX ORDER — SODIUM CHLORIDE, SODIUM LACTATE, POTASSIUM CHLORIDE, CALCIUM CHLORIDE 600; 310; 30; 20 MG/100ML; MG/100ML; MG/100ML; MG/100ML
9 INJECTION, SOLUTION INTRAVENOUS CONTINUOUS
Status: ACTIVE | OUTPATIENT
Start: 2024-10-30 | End: 2024-10-30

## 2024-10-29 RX ORDER — GABAPENTIN 100 MG/1
100 CAPSULE ORAL 3 TIMES DAILY
Status: DISPENSED | OUTPATIENT
Start: 2024-10-29 | End: 2024-10-31

## 2024-10-29 RX ORDER — LIDOCAINE HYDROCHLORIDE 20 MG/ML
JELLY TOPICAL AS NEEDED
Status: DISCONTINUED | OUTPATIENT
Start: 2024-10-29 | End: 2024-10-29 | Stop reason: HOSPADM

## 2024-10-29 RX ORDER — ACETAMINOPHEN 325 MG/1
650 TABLET ORAL EVERY 6 HOURS
Status: DISPENSED | OUTPATIENT
Start: 2024-10-29 | End: 2024-10-31

## 2024-10-29 RX ORDER — SODIUM CHLORIDE 9 MG/ML
INJECTION, SOLUTION INTRAVENOUS AS NEEDED
Status: DISCONTINUED | OUTPATIENT
Start: 2024-10-29 | End: 2024-10-29 | Stop reason: HOSPADM

## 2024-10-29 RX ORDER — ROCURONIUM BROMIDE 10 MG/ML
INJECTION, SOLUTION INTRAVENOUS AS NEEDED
Status: DISCONTINUED | OUTPATIENT
Start: 2024-10-29 | End: 2024-10-29 | Stop reason: SURG

## 2024-10-29 RX ORDER — HYDROCODONE BITARTRATE AND ACETAMINOPHEN 7.5; 325 MG/1; MG/1
1 TABLET ORAL EVERY 6 HOURS PRN
Qty: 30 TABLET | Refills: 0 | Status: SHIPPED | OUTPATIENT
Start: 2024-10-29

## 2024-10-29 RX ORDER — BUPIVACAINE HCL/0.9 % NACL/PF 0.125 %
PLASTIC BAG, INJECTION (ML) EPIDURAL AS NEEDED
Status: DISCONTINUED | OUTPATIENT
Start: 2024-10-29 | End: 2024-10-29 | Stop reason: SURG

## 2024-10-29 RX ORDER — NALOXONE HCL 0.4 MG/ML
0.1 VIAL (ML) INJECTION
Status: DISCONTINUED | OUTPATIENT
Start: 2024-10-29 | End: 2024-11-01 | Stop reason: HOSPADM

## 2024-10-29 RX ORDER — DOCUSATE SODIUM 100 MG/1
100 CAPSULE, LIQUID FILLED ORAL 2 TIMES DAILY PRN
Status: DISCONTINUED | OUTPATIENT
Start: 2024-10-29 | End: 2024-11-01 | Stop reason: HOSPADM

## 2024-10-29 RX ORDER — ACETAMINOPHEN 500 MG
1000 TABLET ORAL ONCE
Status: COMPLETED | OUTPATIENT
Start: 2024-10-29 | End: 2024-10-29

## 2024-10-29 RX ORDER — MELOXICAM 15 MG/1
15 TABLET ORAL ONCE
Status: COMPLETED | OUTPATIENT
Start: 2024-10-29 | End: 2024-10-29

## 2024-10-29 RX ORDER — DEXAMETHASONE SODIUM PHOSPHATE 10 MG/ML
INJECTION, SOLUTION INTRAMUSCULAR; INTRAVENOUS
Status: COMPLETED | OUTPATIENT
Start: 2024-10-29 | End: 2024-10-29

## 2024-10-29 RX ORDER — FENTANYL CITRATE 50 UG/ML
INJECTION, SOLUTION INTRAMUSCULAR; INTRAVENOUS
Status: COMPLETED
Start: 2024-10-29 | End: 2024-10-29

## 2024-10-29 RX ORDER — FIBRINOGEN HUMAN, HUMAN THROMBIN 4 ML
KIT TOPICAL AS NEEDED
Status: DISCONTINUED | OUTPATIENT
Start: 2024-10-29 | End: 2024-10-29 | Stop reason: HOSPADM

## 2024-10-29 RX ORDER — ACETAMINOPHEN 650 MG/1
650 SUPPOSITORY RECTAL EVERY 6 HOURS
Status: ACTIVE | OUTPATIENT
Start: 2024-10-29 | End: 2024-10-31

## 2024-10-29 RX ADMIN — SUGAMMADEX 400 MG: 100 INJECTION, SOLUTION INTRAVENOUS at 13:53

## 2024-10-29 RX ADMIN — HYDROMORPHONE HYDROCHLORIDE 0.5 MG: 1 INJECTION, SOLUTION INTRAMUSCULAR; INTRAVENOUS; SUBCUTANEOUS at 14:55

## 2024-10-29 RX ADMIN — CEFOXITIN SODIUM 2000 MG: 2 POWDER, FOR SOLUTION INTRAVENOUS at 12:26

## 2024-10-29 RX ADMIN — FENTANYL CITRATE 50 MCG: 50 INJECTION, SOLUTION INTRAMUSCULAR; INTRAVENOUS at 14:34

## 2024-10-29 RX ADMIN — BUPIVACAINE HYDROCHLORIDE 60 ML: 2.5 INJECTION, SOLUTION EPIDURAL; INFILTRATION; INTRACAUDAL; PERINEURAL at 12:21

## 2024-10-29 RX ADMIN — Medication 10 MG: at 20:32

## 2024-10-29 RX ADMIN — LABETALOL HYDROCHLORIDE 10 MG: 5 INJECTION, SOLUTION INTRAVENOUS at 14:56

## 2024-10-29 RX ADMIN — MIDAZOLAM HYDROCHLORIDE 1 MG: 1 INJECTION, SOLUTION INTRAMUSCULAR; INTRAVENOUS at 11:51

## 2024-10-29 RX ADMIN — FAMOTIDINE 20 MG: 20 TABLET, FILM COATED ORAL at 09:58

## 2024-10-29 RX ADMIN — ACETAMINOPHEN 1000 MG: 500 TABLET ORAL at 09:58

## 2024-10-29 RX ADMIN — DEXAMETHASONE SODIUM PHOSPHATE 4 MG: 4 INJECTION INTRA-ARTICULAR; INTRALESIONAL; INTRAMUSCULAR; INTRAVENOUS; SOFT TISSUE at 12:26

## 2024-10-29 RX ADMIN — GABAPENTIN 600 MG: 300 CAPSULE ORAL at 09:58

## 2024-10-29 RX ADMIN — Medication 100 MCG: at 13:30

## 2024-10-29 RX ADMIN — Medication 100 MCG: at 12:38

## 2024-10-29 RX ADMIN — LIDOCAINE HYDROCHLORIDE 0.5 ML: 10 INJECTION, SOLUTION EPIDURAL; INFILTRATION; INTRACAUDAL; PERINEURAL at 09:58

## 2024-10-29 RX ADMIN — VALSARTAN 160 MG: 160 TABLET, FILM COATED ORAL at 20:38

## 2024-10-29 RX ADMIN — MELOXICAM 15 MG: 15 TABLET ORAL at 09:58

## 2024-10-29 RX ADMIN — GABAPENTIN 100 MG: 100 CAPSULE ORAL at 20:38

## 2024-10-29 RX ADMIN — SCOPOLAMINE 1 PATCH: 1.5 PATCH, EXTENDED RELEASE TRANSDERMAL at 09:59

## 2024-10-29 RX ADMIN — ROCURONIUM BROMIDE 5 MG: 10 INJECTION INTRAVENOUS at 12:17

## 2024-10-29 RX ADMIN — ONDANSETRON 4 MG: 4 TABLET, ORALLY DISINTEGRATING ORAL at 23:02

## 2024-10-29 RX ADMIN — OXYCODONE HYDROCHLORIDE 5 MG: 5 TABLET ORAL at 20:38

## 2024-10-29 RX ADMIN — Medication 100 MCG: at 12:33

## 2024-10-29 RX ADMIN — CEFOXITIN SODIUM 1000 MG: 1 POWDER, FOR SOLUTION INTRAVENOUS at 20:32

## 2024-10-29 RX ADMIN — Medication 100 MCG: at 13:27

## 2024-10-29 RX ADMIN — SODIUM CHLORIDE, POTASSIUM CHLORIDE, SODIUM LACTATE AND CALCIUM CHLORIDE: 600; 310; 30; 20 INJECTION, SOLUTION INTRAVENOUS at 12:10

## 2024-10-29 RX ADMIN — Medication 100 MCG: at 13:39

## 2024-10-29 RX ADMIN — ACETAMINOPHEN 650 MG: 325 TABLET ORAL at 21:22

## 2024-10-29 RX ADMIN — LIDOCAINE HYDROCHLORIDE 50 MG: 10 INJECTION, SOLUTION EPIDURAL; INFILTRATION; INTRACAUDAL; PERINEURAL at 12:17

## 2024-10-29 RX ADMIN — FENTANYL CITRATE 100 MCG: 50 INJECTION, SOLUTION INTRAMUSCULAR; INTRAVENOUS at 12:17

## 2024-10-29 RX ADMIN — HYDROMORPHONE HYDROCHLORIDE 0.5 MG: 1 INJECTION, SOLUTION INTRAMUSCULAR; INTRAVENOUS; SUBCUTANEOUS at 14:47

## 2024-10-29 RX ADMIN — Medication 100 MCG: at 13:44

## 2024-10-29 RX ADMIN — PROPOFOL 200 MG: 10 INJECTION, EMULSION INTRAVENOUS at 12:17

## 2024-10-29 RX ADMIN — ROCURONIUM BROMIDE 95 MG: 10 INJECTION INTRAVENOUS at 12:34

## 2024-10-29 RX ADMIN — HYDROMORPHONE HYDROCHLORIDE 0.5 MG: 1 INJECTION, SOLUTION INTRAMUSCULAR; INTRAVENOUS; SUBCUTANEOUS at 15:12

## 2024-10-29 RX ADMIN — ONDANSETRON 4 MG: 2 INJECTION INTRAMUSCULAR; INTRAVENOUS at 12:38

## 2024-10-29 RX ADMIN — POTASSIUM CHLORIDE AND SODIUM CHLORIDE 100 ML/HR: 900; 150 INJECTION, SOLUTION INTRAVENOUS at 18:25

## 2024-10-29 RX ADMIN — DEXAMETHASONE SODIUM PHOSPHATE 4 MG: 10 INJECTION, SOLUTION INTRAMUSCULAR; INTRAVENOUS at 12:21

## 2024-10-29 RX ADMIN — Medication 200 MCG: at 13:20

## 2024-10-29 NOTE — H&P
Pre-Op H&P  Moses Wiseman  [id number]  1965      Chief complaint: Prostate cancer      Subjective:  Patient is a 59 y.o.male presents for scheduled surgery by Dr. Bustso. He anticipates a ROBOTIC PROSTATECTOMY WITH NODES  today. He underwent prostate biopsy on 3/29/24 due to elevated PSA. Pathology showed adenocarcinoma of prostate. PSA was 19. [place] 4+3=7. He denies urinary symptoms.      Review of Systems:  Constitutional-- No fever, chills or sweats. No fatigue.  CV-- No chest pain, palpitation or syncope. +HTN  Resp-- No cough, hemoptysis. +SOB, ROBIN no cpap   Skin--No rashes or lesions      Allergies: No Known Allergies      Home Meds:  Medications Prior to Admission   Medication Sig Dispense Refill Last Dose/Taking    fluticasone (FLONASE) 50 MCG/ACT nasal spray Administer 2 sprays into the nostril(s) as directed by provider Daily.   10/28/2024    loratadine (CLARITIN) 10 MG tablet Take 1 tablet by mouth Daily.   Past Week    nabumetone (RELAFEN) 750 MG tablet Take 1 tablet by mouth 2 (Two) Times a Day. 180 tablet 0 10/28/2024    valsartan (DIOVAN) 160 MG tablet Take 1 tablet by mouth Daily.   Past Week    glucosamine-chondroitin 500-400 MG capsule capsule Take 2 capsules by mouth Daily.   Unknown    NON FORMULARY Nitric oxide-  2 tablets daily   Unknown         PMH:   Past Medical History:   Diagnosis Date    Arthritis     bilat knee    Asthma     allergy induced    Cancer     prostate    Headache     Hypertension     Pituitary tumor     will be removed after prostate surgery    Sleep apnea     cannot tolerate the CPAP    Tinnitus     Wears glasses      PSH:    Past Surgical History:   Procedure Laterality Date    MULTIPLE TOOTH EXTRACTIONS      not wisdom tooth    PROSTATE BIOPSY         Immunization History:  Influenza: No  Pneumococcal: No  Tetanus: Unknown    Social History:   Tobacco:   Social History     Tobacco Use   Smoking Status Never   Smokeless Tobacco Never      Alcohol:    "  Social History     Substance and Sexual Activity   Alcohol Use Yes    Comment: rarely         Physical Exam:BP (!) 168/111 (BP Location: Right arm, Patient Position: Lying)   Pulse 98   Temp 97.8 °F (36.6 °C) (Temporal)   Resp 18   Ht 182.9 cm (72\")   Wt (!) 150 kg (330 lb)   SpO2 94%   BMI 44.76 kg/m²       General Appearance:    Alert, cooperative, no distress, appears stated age   Head:    Normocephalic, without obvious abnormality, atraumatic   Lungs:     Clear to auscultation bilaterally, respirations unlabored    Heart:   Regular rate and rhythm, S1 and S2 normal    Abdomen:    Soft without tenderness. Obese    Extremities:   Extremities normal, atraumatic, no cyanosis or edema   Skin:   Skin color, texture, turgor normal, no rashes or lesions   Neurologic:   Grossly intact     Results Review:     LABS:  Lab Results   Component Value Date    WBC 7.39 10/22/2024    HGB 15.9 10/22/2024    HCT 50.5 10/22/2024    MCV 88.8 10/22/2024     10/22/2024    GLUCOSE 98 10/22/2024    BUN 12 10/22/2024    CREATININE 0.89 10/22/2024     10/22/2024    K 4.3 10/22/2024     10/22/2024    CO2 28.0 10/22/2024    CALCIUM 9.2 10/22/2024       RADIOLOGY:  Imaging Results (Last 72 Hours)       ** No results found for the last 72 hours. **            I reviewed the patient's new clinical results.    Cancer Staging (if applicable)  Cancer Patient: __ yes __no __unknown; If yes, clinical stage T:__ N:__M:__, stage group or __N/A      Impression: Prostate cancer      Plan: ROBOTIC PROSTATECTOMY WITH NODES       BRY Medeiros   10/29/2024   10:52 EDT  "

## 2024-10-29 NOTE — H&P
Admission HP     Patient Name: Moses Wiseman  MRN: [id number]  : 1965  DOS: 10/29/2024    Attending: lAex Bustos Jr*    Primary Care Provider: Andrea Aragon DO      Patient Care Team:  Andrea Aragon DO as PCP - General (Internal Medicine)  Moses Biswas PA as Physician Assistant (Cardiology)    Chief complaint:  Prostate cancer    Subjective   Patient is a pleasant 59 y.o. male presented for scheduled surgery by Dr. Juli Bishop.    Per his note (59-year-old gentleman with localized prostate cancer is opted for surgical therapy understanding risk benefits and alternatives. He gives his full consent for the above named procedure.).    Noting that patient had a prostate biopsy on 3/29/2024 due to elevated PSA level 219.  Was found to have prostate CA Anh 4+3 = 7.    I saw him preoperatively and reviewed with patient past medical history and medications.    Subsequent notes indicate he underwent robotic prostatectomy with lymph nodes under general anesthesia block, tolerated surgery well, was admitted for further management.    I stopped by to see him after surgery he was sound asleep with nasal cannula oxygen.  Discussed with RN.  Patient has requested CPAP, ordered.      Allergies:  No Known Allergies    Meds:  Medications Prior to Admission   Medication Sig Dispense Refill Last Dose/Taking    fluticasone (FLONASE) 50 MCG/ACT nasal spray Administer 2 sprays into the nostril(s) as directed by provider Daily.   10/28/2024    loratadine (CLARITIN) 10 MG tablet Take 1 tablet by mouth Daily.   Past Week    nabumetone (RELAFEN) 750 MG tablet Take 1 tablet by mouth 2 (Two) Times a Day. 180 tablet 0 10/28/2024    valsartan (DIOVAN) 160 MG tablet Take 1 tablet by mouth Daily.   Past Week    glucosamine-chondroitin 500-400 MG capsule capsule Take 2 capsules by mouth Daily.   Unknown    NON FORMULARY Nitric oxide-  2 tablets daily   Unknown        Past Medical History:  "  Diagnosis Date    Arthritis     bilat knee    Asthma     allergy induced    Cancer     prostate    Headache     Hypertension     Pituitary tumor     will be removed after prostate surgery    Sleep apnea     cannot tolerate the CPAP    Tinnitus     Wears glasses      Past Surgical History:   Procedure Laterality Date    MULTIPLE TOOTH EXTRACTIONS      not wisdom tooth    PROSTATE BIOPSY       Family History   Problem Relation Age of Onset    Heart attack Mother     Prostate cancer Father     Multiple sclerosis Sister     Dementia Sister     Hyperlipidemia Brother     Prostate cancer Brother     Hypertension Brother      Social History     Tobacco Use    Smoking status: Never    Smokeless tobacco: Never   Vaping Use    Vaping status: Never Used   Substance Use Topics    Alcohol use: Yes     Comment: rarely    Drug use: Never       Review of Systems  Pertinent items are noted in HPI    Vital Signs  /100 (BP Location: Left arm, Patient Position: Lying)   Pulse 87   Temp 97.2 °F (36.2 °C) (Axillary)   Resp 12   Ht 182.9 cm (72\")   Wt (!) 150 kg (330 lb)   SpO2 95%   BMI 44.76 kg/m²     Physical Exam:    General Appearance:    Alert, cooperative, in no acute distress   Head:    Normocephalic, without obvious abnormality, atraumatic   Eyes:            Lids and lashes normal, conjunctivae and sclerae normal, no   icterus, no pallor, corneas clear    Ears:    Ears appear intact with no abnormalities noted   Throat:   No oral lesions, no thrush, oral mucosa moist   Neck:   No adenopathy, supple, trachea midline, no thyromegaly         Lungs:     Clear to auscultation,respirations regular, even and      unlabored, no wheezes or rales.    Heart:    Regular rhythm and normal rate, normal S1 and S2, no   murmur, no gallop    Abdomen:    Soft, incisions intact, benign exam, KATINA drain present.   Genitalia:    Deferred  Terry present   Extremities:   Moves all extremities well, no edema, no cyanosis, no          " "redness   Pulses:   Pulses palpable and equal bilaterally   Skin:   No bleeding, bruising or rash              Invalid input(s): \"NEUTOPHILPCT\"        Invalid input(s): \"LABALBU\", \"PROT\"  Lab Results   Component Value Date    HGBA1C 6.40 (H) 10/22/2024      Latest Reference Range & Units 10/22/24 12:25   Sodium 136 - 145 mmol/L 140   Potassium 3.5 - 5.2 mmol/L 4.3   Chloride 98 - 107 mmol/L 102   CO2 22.0 - 29.0 mmol/L 28.0   Anion Gap 5.0 - 15.0 mmol/L 10.0   BUN 6 - 20 mg/dL 12   Creatinine 0.76 - 1.27 mg/dL 0.89   BUN/Creatinine Ratio 7.0 - 25.0  13.5   eGFR >60.0 mL/min/1.73 98.7   Glucose 65 - 99 mg/dL 98   Calcium 8.6 - 10.5 mg/dL 9.2   Hemoglobin A1C 4.80 - 5.60 % 6.40 (H)   WBC 3.40 - 10.80 10*3/mm3 7.39   RBC 4.14 - 5.80 10*6/mm3 5.69   Hemoglobin 13.0 - 17.7 g/dL 15.9   Hematocrit 37.5 - 51.0 % 50.5   Platelets 140 - 450 10*3/mm3 344   RDW 12.3 - 15.4 % 14.2   MCV 79.0 - 97.0 fL 88.8   MCH 26.6 - 33.0 pg 27.9   MCHC 31.5 - 35.7 g/dL 31.5   MPV 6.0 - 12.0 fL 8.5   RDW-SD 37.0 - 54.0 fl 45.1   (H): Data is abnormally high  Assessment and Plan:       S/P prostatectomy    Prostate cancer    Morbid obesity    HTN (hypertension)    ROBIN (obstructive sleep apnea), intolerant of CPAP  Prediabetes    Plan:    Admitted for further management.      1. Ambulation, encouraged , starting today  2. Pain control-prns   3. IS-encourage  4. DVT proph- Mechanicals and Lovenox subcu to start POD1.  5. Bowel regimen  6. Resume home medications as appropriate.  7. Monitor post-op labs and path.  8. Clear liquid diet, advance as tolerated with return of bowel function  9.Monitor output from KATINA drain, aim to remove prior to hospital discharge.  10.Discharge planning.    - Hypertension:  Resume home medications as appropriate, formulary substitution when indicated.  Holding parameters.  PRN medications for elevated blood pressure.    -ROBIN:  Ordered CPAP.   Monitor O2 sats.    -Prediabetes: Will explain to patient implication of A1C " elevation, need to modify diet and increase activity, and to f/u with PCP.  Monitor blood glucose during hospital stay.    Dragon disclaimer:  Part of this encounter note is an electronic transcription/translation of spoken language to printed text. The electronic translation of spoken language may permit erroneous, or at times, nonsensical words or phrases to be inadvertently transcribed; Although I have reviewed the note for such errors, some may still exist.      Odalys Doyle MD  10/29/24  17:36 EDT

## 2024-10-29 NOTE — OP NOTE
PROSTATECTOMY LAPAROSCOPIC WITH DAVINCI ROBOT  Procedure Note    [facility]  10/29/2024    Pre-op Diagnosis:   Prostate cancer    Post-op Diagnosis:     Prostate cancer    Procedure/CPT® Codes:      Procedure(s):  ROBOTIC PROSTATECTOMY WITH NODES    Surgeon(s):  Alex Bustos Jr., MD    Anesthesia: General with Block    Staff:   Circulator: Debora Srinivasan, MOHIT; Pearl Man RN; Mayra Lim RN  Scrub Person: Linda Gonzalez; Kiarra Mcdaniel; Gladys Huffman  Assistant: Shon Glover PA    Assistant: Shon Glover PA Was needed to assist in this case with retraction, exposure, instrument placement.    Estimated Blood Loss: <500ml  Urine Voided: * No values recorded between 10/29/2024 12:07 PM and 10/29/2024  1:44 PM *    Specimens:                Specimens       ID Source Type Tests Collected By Collected At Frozen?    A Pelvis Lymph Node TISSUE PATHOLOGY EXAM   Aelx Bustos Jr., MD 10/29/24 1342     Description: BILATERAL  OBTURATOR    This specimen was not marked as sent.    B Prostate Tissue TISSUE PATHOLOGY EXAM   Alex Bustos Jr., MD 10/29/24 1342     This specimen was not marked as sent.              Drains:   Closed/Suction Drain Abdomen 10 Fr. (Active)       Urethral Catheter Silicone 18 Fr. (Active)       Findings: No extraprostatic extension grossly    Complications: None    History: 59-year-old gentleman with localized prostate cancer is opted for surgical therapy understanding risk benefits and alternatives.  He gives his full consent for the above named procedure.    Operative Report: After consent is obtained the patient is brought to the operating suite was placed in supine position.  Anesthesia was induced.  He is carefully placed in dorsolithotomy position padding all pressure points noting his morbid obesity.  He was sterilely prepped and draped in normal fashion.  Veress needle technique is used at the umbilicus to create pneumoperitoneum.  8 mm  blunt Visiport is used to create a camera trocar site also at the umbilicus.  The remainder of the robotic and assistant trocars were placed under direct vision.  Patient was placed in steep Trendelenburg position the robot is docked.  We entered the space of Retzius dissecting bladder posteriorly.  Endopelvic fascia was identified and opened bilaterally and puboprostatic ligaments were divided.  Ligature of 0 Vicryl sutures placed around the dorsal venous complex in a figure-of-eight fashion.  Anterior bladder neck is opened staying clear the base the prostate.  Posterior bladder neck dissection is carried out and the posterior bladder neck is divided.  Seminal vesicles and vas deferens are dissected out in their entirety using minimal thermal energy.  We dissected the space posterior the prostate anterior the rectum up to level the apex of the prostate.  Prostatic pedicles were identified and taken down using a vessel sealer device.  We lifted the veil of Aphrodite and teased away our neurovascular bundles from the posterior lateral aspects of the prostate using a thermic technique.  This was enhanced by using amniotic allograft.  Anterior dissection was performed by dividing dorsal venous complex.  Apical dissection performed leaving a nice stump of urethra for anastomosis.  After the urethra was divided the specimen was placed in the specimen sac.  Bilateral obturator lymph node dissection was carried out using our vessel sealer device for hemostasis.  Posterior tennis racquet tailoring of the bladder neck is performed using a 2-0 Vicryl suture.  Running 2-0 Monocryl vesicourethral anastomosis is then performed which is watertight.  Drain is brought in through the right most lateral trocar site and placed in the pelvis.  Robot is undocked and under laparoscopic guidance we removed all of our trocars.  We enlarged the fascial defect at the umbilicus and delivered our specimen within the specimen sac.  This  fascial defect was closed using a running 0 PDS suture.  All incisions were irrigated.  Subcutaneous tissues brought together and 3-0 Vicryl.  Skin was brought together using Dermabond.  KATINA drain and Terry catheter secured in place.  Anesthesia reversed.  Patient was taken the recovery in stable condition.    Alex Bustos Jr., MD     Date: 10/29/2024  Time: 13:48 EDT

## 2024-10-29 NOTE — ANESTHESIA PROCEDURE NOTES
"Peripheral Block      Patient reassessed immediately prior to procedure    Patient location during procedure: OR  Reason for block: at surgeon's request and post-op pain management  Preanesthetic Checklist  Completed: patient identified, IV checked, site marked, risks and benefits discussed, surgical consent, monitors and equipment checked, pre-op evaluation and timeout performed  Prep:  Pt Position: supine  Sterile barriers:cap, gloves, mask and washed/disinfected hands  Prep: ChloraPrep  Patient monitoring: blood pressure monitoring, continuous pulse oximetry and EKG  Procedure    Sedation: yes  Performed under: general  Guidance:ultrasound guided  Images:still images obtained, printed/placed on chart    Laterality:Bilateral  Block Type:TAP  Injection Technique:single-shot  Needle Type:short-bevel and echogenic  Needle Gauge:20 G  Resistance on Injection: none    Medications Used: bupivacaine PF (MARCAINE) 0.25 % injection - Injection   60 mL - 10/29/2024 12:21:00 PM  dexamethasone sodium phosphate injection - Injection   4 mg - 10/29/2024 12:21:00 PM      Medications  Comment:Block Injection:  LA dose divided between Right and Left block        Post Assessment  Injection Assessment: negative aspiration for heme, incremental injection and no paresthesia on injection  Patient Tolerance:comfortable throughout block  Complications:no  Additional Notes    Subcostal TAPs    A high-frequency linear transducer, with sterile cover, was placed sub-xiphoid to identify Linea Alba, right and left Rectus Abdominus Muscles (ADDISON). The transducer was moved either right or left subcostally to identify the ADDISON and the Transverse Abdominus Muscle (GELLER). The insertion site was prepped in sterile fashion and then localized with 2-5 ml of 1% Lidocaine. Using ultrasound-guidance, a 20-gauge B-Hodges 4\" Ultraplex 360 non-stimulating echogenic needle was advanced in plane, from medial to lateral, until the tip of the needle was in the " fascial plane between the ADDISON and GELLER. 1-3ml of preservative free normal saline was used to hydro-dissect the fascial planes. After the fascial plane was verified, the local anesthetic (LA) was injected. The procedure was repeated on the opposite side for bilateral coverage. Aspiration every 5 ml to prevent intravascular injection. Injection was completed with negative aspiration of blood and negative intravascular injection. Injection pressures were normal with minimal resistance. The subcostal approach to the TAP nerve block ideally anesthetizes the intercostal nerves T6-T9.    Performed by: Rashmi Franco CRNA

## 2024-10-29 NOTE — ANESTHESIA POSTPROCEDURE EVALUATION
Patient: Moses Wiseman    Procedure Summary       Date: 10/29/24 Room / Location:  [facility]  [facility]    Anesthesia Start: 1210 Anesthesia Stop:     Procedure: ROBOTIC PROSTATECTOMY WITH NODES (Pelvis) Diagnosis:     Surgeons: Alex Bustos Jr., MD Provider: Juan Ambrocio MD    Anesthesia Type: general with block ASA Status: 3            Anesthesia Type: general with block    Vitals  Vitals Value Taken Time   BP     Temp     Pulse 95 10/29/24 1423   Resp     SpO2 93 % 10/29/24 1423   Vitals shown include unfiled device data.    179/101  97.8    Post Anesthesia Care and Evaluation    Patient location during evaluation: PACU  Patient participation: complete - patient participated  Level of consciousness: awake and alert  Pain management: adequate    Airway patency: patent  Anesthetic complications: No anesthetic complications  PONV Status: none  Cardiovascular status: hemodynamically stable and acceptable  Respiratory status: nonlabored ventilation, acceptable and face mask  Hydration status: acceptable

## 2024-10-29 NOTE — ANESTHESIA PROCEDURE NOTES
Airway  Urgency: elective    Date/Time: 10/29/2024 12:19 PM  Airway not difficult    General Information and Staff    Patient location during procedure: OR    Indications and Patient Condition  Indications for airway management: airway protection    Preoxygenated: yes  MILS maintained throughout  Mask difficulty assessment: 0 - not attempted    Final Airway Details  Final airway type: endotracheal airway      Successful airway: ETT  Cuffed: yes   Successful intubation technique: video laryngoscopy  Facilitating devices/methods: intubating stylet  Endotracheal tube insertion site: oral  Blade: Bradford  Blade size: 3  ETT size (mm): 7.5  Cormack-Lehane Classification: grade I - full view of glottis  Placement verified by: chest auscultation and capnometry   Measured from: lips  ETT/EBT  to lips (cm): 21  Number of attempts at approach: 1  Assessment: lips, teeth, and gum same as pre-op and atraumatic intubation

## 2024-10-29 NOTE — ANESTHESIA PREPROCEDURE EVALUATION
Anesthesia Evaluation     Patient summary reviewed and Nursing notes reviewed   no history of anesthetic complications:   NPO Solid Status: > 8 hours  NPO Liquid Status: > 2 hours           Airway   Mallampati: II  TM distance: >3 FB  Neck ROM: full  Possible difficult intubation and Large neck circumference  Dental - normal exam     Pulmonary    (+) asthma (Allergy induced),sleep apnea (Intolerant of CPAP), decreased breath sounds  Cardiovascular - normal exam    Rhythm: regular  Rate: normal    (+) hypertension    ROS comment: ECHO 09/2024: EF > 70%, trace to mild AI, mild dilation of the aortic root/SoV/arch is present, abnormal LVOT contour without outflow obstruction    Stress Test w/ MPI 09/2024:  ·  Left ventricular ejection fraction is normal (Calculated EF = 56%).  ·  Myocardial perfusion imaging indicates a normal myocardial perfusion study with no evidence of ischemia.  Estimated CFR of 3.6.  ·  Impressions are consistent with a low risk study.      Neuro/Psych  (+) headaches    ROS Comment: Pituitary Tumor - scheduled to be removed after prostatectomy surgery  GI/Hepatic/Renal/Endo    (+) morbid obesity    Musculoskeletal     Abdominal    Substance History   (+) alcohol use (Occasional beer or wine cooler)     OB/GYN          Other   arthritis,   history of cancer (Prostate)                  Anesthesia Plan    ASA 3     general with block     (Post-Induction TAP block    Ultrasound guided right forearm 18g PIV)  intravenous induction     Anesthetic plan, risks, benefits, and alternatives have been provided, discussed and informed consent has been obtained with: patient.    Plan discussed with CRNA.    CODE STATUS:

## 2024-10-30 LAB
ANION GAP SERPL CALCULATED.3IONS-SCNC: 9 MMOL/L (ref 5–15)
BUN SERPL-MCNC: 11 MG/DL (ref 6–20)
BUN/CREAT SERPL: 12.2 (ref 7–25)
CALCIUM SPEC-SCNC: 8.5 MG/DL (ref 8.6–10.5)
CHLORIDE SERPL-SCNC: 101 MMOL/L (ref 98–107)
CO2 SERPL-SCNC: 28 MMOL/L (ref 22–29)
CREAT SERPL-MCNC: 0.9 MG/DL (ref 0.76–1.27)
DEPRECATED RDW RBC AUTO: 49.4 FL (ref 37–54)
EGFRCR SERPLBLD CKD-EPI 2021: 98.4 ML/MIN/1.73
ERYTHROCYTE [DISTWIDTH] IN BLOOD BY AUTOMATED COUNT: 14.6 % (ref 12.3–15.4)
GLUCOSE SERPL-MCNC: 116 MG/DL (ref 65–99)
HCT VFR BLD AUTO: 42.1 % (ref 37.5–51)
HGB BLD-MCNC: 12.7 G/DL (ref 13–17.7)
MCH RBC QN AUTO: 27.9 PG (ref 26.6–33)
MCHC RBC AUTO-ENTMCNC: 30.2 G/DL (ref 31.5–35.7)
MCV RBC AUTO: 92.3 FL (ref 79–97)
PLATELET # BLD AUTO: 302 10*3/MM3 (ref 140–450)
PMV BLD AUTO: 8.6 FL (ref 6–12)
POTASSIUM SERPL-SCNC: 4.5 MMOL/L (ref 3.5–5.2)
RBC # BLD AUTO: 4.56 10*6/MM3 (ref 4.14–5.8)
SODIUM SERPL-SCNC: 138 MMOL/L (ref 136–145)
WBC NRBC COR # BLD AUTO: 9.44 10*3/MM3 (ref 3.4–10.8)

## 2024-10-30 PROCEDURE — 25010000002 CEFOXITIN PER 1 G: Performed by: UROLOGY

## 2024-10-30 PROCEDURE — 85027 COMPLETE CBC AUTOMATED: CPT | Performed by: UROLOGY

## 2024-10-30 PROCEDURE — 25010000002 ENOXAPARIN PER 10 MG: Performed by: UROLOGY

## 2024-10-30 PROCEDURE — 80048 BASIC METABOLIC PNL TOTAL CA: CPT | Performed by: UROLOGY

## 2024-10-30 PROCEDURE — 25010000002 SODIUM CHLORIDE 0.9 % WITH KCL 20 MEQ 20-0.9 MEQ/L-% SOLUTION: Performed by: UROLOGY

## 2024-10-30 RX ORDER — DEXTROSE MONOHYDRATE 25 G/50ML
25 INJECTION, SOLUTION INTRAVENOUS
Status: DISCONTINUED | OUTPATIENT
Start: 2024-10-30 | End: 2024-11-01 | Stop reason: HOSPADM

## 2024-10-30 RX ORDER — INSULIN LISPRO 100 [IU]/ML
2-7 INJECTION, SOLUTION INTRAVENOUS; SUBCUTANEOUS
Status: DISCONTINUED | OUTPATIENT
Start: 2024-10-30 | End: 2024-11-01 | Stop reason: HOSPADM

## 2024-10-30 RX ORDER — NICOTINE POLACRILEX 4 MG
15 LOZENGE BUCCAL
Status: DISCONTINUED | OUTPATIENT
Start: 2024-10-30 | End: 2024-11-01 | Stop reason: HOSPADM

## 2024-10-30 RX ORDER — IBUPROFEN 600 MG/1
1 TABLET ORAL
Status: DISCONTINUED | OUTPATIENT
Start: 2024-10-30 | End: 2024-11-01 | Stop reason: HOSPADM

## 2024-10-30 RX ADMIN — CEFOXITIN SODIUM 1000 MG: 1 POWDER, FOR SOLUTION INTRAVENOUS at 05:58

## 2024-10-30 RX ADMIN — ACETAMINOPHEN 650 MG: 325 TABLET ORAL at 22:13

## 2024-10-30 RX ADMIN — GABAPENTIN 100 MG: 100 CAPSULE ORAL at 09:18

## 2024-10-30 RX ADMIN — ENOXAPARIN SODIUM 40 MG: 100 INJECTION SUBCUTANEOUS at 09:18

## 2024-10-30 RX ADMIN — ACETAMINOPHEN 650 MG: 325 TABLET ORAL at 16:48

## 2024-10-30 RX ADMIN — VALSARTAN 160 MG: 160 TABLET, FILM COATED ORAL at 09:18

## 2024-10-30 RX ADMIN — CEFOXITIN SODIUM 1000 MG: 1 POWDER, FOR SOLUTION INTRAVENOUS at 14:08

## 2024-10-30 RX ADMIN — ACETAMINOPHEN 650 MG: 325 TABLET ORAL at 05:58

## 2024-10-30 RX ADMIN — GABAPENTIN 100 MG: 100 CAPSULE ORAL at 21:12

## 2024-10-30 RX ADMIN — OXYCODONE HYDROCHLORIDE 5 MG: 5 TABLET ORAL at 03:35

## 2024-10-30 RX ADMIN — OXYCODONE HYDROCHLORIDE 5 MG: 5 TABLET ORAL at 21:12

## 2024-10-30 RX ADMIN — PANTOPRAZOLE SODIUM 40 MG: 40 TABLET, DELAYED RELEASE ORAL at 05:58

## 2024-10-30 RX ADMIN — ACETAMINOPHEN 650 MG: 325 TABLET ORAL at 10:32

## 2024-10-30 RX ADMIN — OXYCODONE HYDROCHLORIDE 5 MG: 5 TABLET ORAL at 15:06

## 2024-10-30 RX ADMIN — POTASSIUM CHLORIDE AND SODIUM CHLORIDE 100 ML/HR: 900; 150 INJECTION, SOLUTION INTRAVENOUS at 04:59

## 2024-10-30 RX ADMIN — GABAPENTIN 100 MG: 100 CAPSULE ORAL at 16:48

## 2024-10-30 RX ADMIN — POTASSIUM CHLORIDE AND SODIUM CHLORIDE 100 ML/HR: 900; 150 INJECTION, SOLUTION INTRAVENOUS at 16:27

## 2024-10-30 RX ADMIN — ENOXAPARIN SODIUM 40 MG: 100 INJECTION SUBCUTANEOUS at 21:12

## 2024-10-30 RX ADMIN — OXYCODONE HYDROCHLORIDE 5 MG: 5 TABLET ORAL at 10:32

## 2024-10-30 RX ADMIN — CETIRIZINE HYDROCHLORIDE 5 MG: 10 TABLET, FILM COATED ORAL at 09:18

## 2024-10-30 NOTE — PROGRESS NOTES
"IM progress note      [facility] Wiseman  [id number]  1965     LOS: 0 days     Attending: Alex Bustos Jr*    Primary Care Provider: Andrea Aragon DO      Chief Complaint/Reason for visit:  Abd pain    Subjective   Was doing well when seen this morning. Good pain control. Apparently, he struggled to walk 100 feet and reported he did not feel ready to go home.    Objective     Vital Signs    Visit Vitals  /69 (BP Location: Left arm, Patient Position: Lying)   Pulse 79   Temp 99.1 °F (37.3 °C) (Oral)   Resp 16   Ht 182.9 cm (72\")   Wt (!) 150 kg (330 lb)   SpO2 92%   BMI 44.76 kg/m²     Temp (24hrs), Av.9 °F (36.6 °C), Min:97 °F (36.1 °C), Max:99.1 °F (37.3 °C)      Nutrition: PO    Respiratory: RA    Physical Exam:     General Appearance:    Alert, cooperative, in no acute distress   Head:    Normocephalic, without obvious abnormality, atraumatic    Lungs:     Normal effort, symmetric chest rise, no crepitus, clear to      auscultation bilaterally             Heart:    Regular rhythm and normal rate, normal S1 and S2   Abdomen:     Normal bowel sounds, no masses, no organomegaly, soft        non-tender, non-distended, no guarding, no rebound                tenderness   Extremities:   Soft, benign, clean incisions. KATINA drain, out prior to discharge. Obese    Pulses:   Pulses palpable and equal bilaterally   Skin:   No bleeding, bruising or rash   Neurologic:   Moves all extremities with no obvious focal motor deficit.  Cranial nerves 2 - 12 grossly intact     Results Review:     I reviewed the patient's new clinical results.   Results from last 7 days   Lab Units 10/30/24  1028   WBC 10*3/mm3 9.44   HEMOGLOBIN g/dL 12.7*   HEMATOCRIT % 42.1   PLATELETS 10*3/mm3 302     Results from last 7 days   Lab Units 10/30/24  1028   SODIUM mmol/L 138   POTASSIUM mmol/L 4.5   CHLORIDE mmol/L 101   CO2 mmol/L 28.0   BUN mg/dL 11   CREATININE mg/dL 0.90   CALCIUM mg/dL 8.5*   GLUCOSE mg/dL " 116*     I reviewed the patient's new imaging including images and reports.    All medications reviewed.   acetaminophen, 650 mg, Oral, Q6H   Or  acetaminophen, 650 mg, Oral, Q6H   Or  acetaminophen, 650 mg, Rectal, Q6H  ceFOXitin, 1,000 mg, Intravenous, Q8H  cetirizine, 5 mg, Oral, Daily  enoxaparin, 40 mg, Subcutaneous, Q12H  gabapentin, 100 mg, Oral, TID  insulin lispro, 2-7 Units, Subcutaneous, 4x Daily AC & at Bedtime  pantoprazole, 40 mg, Oral, Q AM  valsartan, 160 mg, Oral, Daily        Assessment & Plan     S/P prostatectomy    Prostate cancer    Morbid obesity    HTN (hypertension)    ROBIN (obstructive sleep apnea), intolerant of CPAP      Plan  1. Ambulation  2. Pain control-prns   3. IS-encouraged  4. DVT proph- mechs/lovenox  5. Bowel regimen  6. Regular diet. Continue IVF   7. Monitor post-op labs  8. DC planning for home     - Hypertension:  Resume home medications as appropriate, formulary substitution when indicated.  Holding parameters.  PRN medications for elevated blood pressure.     -ROBIN:  Ordered CPAP.   Monitor O2 sats.     -Prediabetes: Will explain to patient implication of A1C elevation, need to modify diet and increase activity, and to f/u with PCP.  Monitor blood glucose during hospital stay.      BRY Medeiros  10/30/24  13:36 EDT

## 2024-10-30 NOTE — CASE MANAGEMENT/SOCIAL WORK
Continued Stay Note  [facility]     Patient Name: Moses Wiseman  MRN: [id number]  Today's Date: 10/30/2024    Admit Date: 10/29/2024    Plan: IDP   Discharge Plan       Row Name 10/30/24 1136       Plan    Plan IDP    Patient/Family in Agreement with Plan yes    Plan Comments Spoke with patient at bedside for IDP, daughter present. Lives in [place] with spouse. Pt with concerns about going home at discharge as pt daughter states that her and her mother will be unable to assist pt with any needs based on his size. Explained that pt should be able to go home without difficulty. Spoke with bedside RN and she was in process of going to assist pt up to ambulate. Pt currently with discharge orders.    Final Discharge Disposition Code 01 - home or self-care                   Discharge Codes    No documentation.                 Expected Discharge Date and Time       Expected Discharge Date Expected Discharge Time    Oct 30, 2024               Ale Dias RN

## 2024-10-30 NOTE — PROGRESS NOTES
Patient is doing well this morning    Pain well-controlled    Reports flatus    Abdomen is benign and incisions look okay  Urine is yellow clear in catheter tubing    KATINA drain output appropriate        Likely home later today if he continues to have reliable bowel function    Home with catheter    KATINA drain out prior to discharge    He will follow-up with me in 10 days for potential trial of void/cystogram

## 2024-10-30 NOTE — DISCHARGE SUMMARY
Patient Name: Moses Wiseman  MRN: [id number]  : 1965  DOS: 10/30/2024    Attending: Alex Bustos Jr*    Primary Care Provider: Andrea Aragon DO    Date of Admission:.10/29/2024  9:16 AM    Date of Discharge:  10/30/2024    Discharge Diagnosis:   S/P prostatectomy    Prostate cancer    Morbid obesity    HTN (hypertension)    ROBIN (obstructive sleep apnea), intolerant of CPAP      Hospital Course    At admit:    Patient is a pleasant 59 y.o. male presented for scheduled surgery by Dr. Juli Bishop.     Per his note (59-year-old gentleman with localized prostate cancer is opted for surgical therapy understanding risk benefits and alternatives. He gives his full consent for the above named procedure.).     Noting that patient had a prostate biopsy on 3/29/2024 due to elevated PSA level 219.  Was found to have prostate CA Anh 4+3 = 7.     I saw him preoperatively and reviewed with patient past medical history and medications.     Subsequent notes indicate he underwent robotic prostatectomy with lymph nodes under general anesthesia block, tolerated surgery well, was admitted for further management.     I stopped by to see him after surgery he was sound asleep with nasal cannula oxygen.  Discussed with RN.  Patient has requested CPAP, ordered.    After admit:    He was provided pain medication as needed for pain control, he received DVT prophylaxis with mechanicals, and Lovenox.  Adjustments were made to pain medications to optimize postop pain management. Risks and benefits of opiate medications discussed with patient.  Hai report in chart was reviewed prior to discharge.    His home medications were resumed as appropriate, he was started on clear liquid diet which was tolerated and advanced to regular diet .    He was provided a bowel regimen and was encouraged to ambulate frequently which he did.    During his stay he had evidence of bowel function return and he tolerated his  "by mouth diet well.    He had a KATINA drain postoperatively which has since been removed.      He continues to have a Terry catheter in place which he will keep attached to a leg bag until his follow-up appointment with urology.    When I saw him today he is doing quite well and he is ready for discharge home.      Procedures Performed    10/29/2024     Pre-op Diagnosis:   Prostate cancer     Post-op Diagnosis:     Prostate cancer    Procedure(s):  ROBOTIC PROSTATECTOMY WITH NODES     Surgeon(s):  Alex Bustos Jr., MD    Pertinent Test Results:    I reviewed the patient's new clinical results.   Results from last 7 days   Lab Units 10/30/24  1028   WBC 10*3/mm3 9.44   HEMOGLOBIN g/dL 12.7*   HEMATOCRIT % 42.1   PLATELETS 10*3/mm3 302     Results from last 7 days   Lab Units 10/30/24  1028   SODIUM mmol/L 138   POTASSIUM mmol/L 4.5   CHLORIDE mmol/L 101   CO2 mmol/L 28.0   BUN mg/dL 11   CREATININE mg/dL 0.90   CALCIUM mg/dL 8.5*   GLUCOSE mg/dL 116*     I reviewed the patient's new imaging including images and reports.      Discharge Assessment:    Vital Signs  Visit Vitals  /80 (BP Location: Left arm, Patient Position: Lying)   Pulse 98   Temp 98.8 °F (37.1 °C) (Oral)   Resp 16   Ht 182.9 cm (72\")   Wt (!) 150 kg (330 lb)   SpO2 96%   BMI 44.76 kg/m²     Temp (24hrs), Av.7 °F (36.5 °C), Min:97 °F (36.1 °C), Max:98.8 °F (37.1 °C)      General Appearance:    Alert, cooperative, in no acute distress   Lungs:     Clear to auscultation,respirations regular, even and unlabored    Heart:    Regular rhythm and normal rate, normal S1 and S2    Abdomen:   Soft, benign, clean incisions.  KATINA drain, out prior to discharge.  : Terry with yellow urine   Extremities:   Moves all extremities well, no edema, no cyanosis, no redness   Pulses:   Pulses palpable and equal bilaterally   Skin:   No bleeding, bruising or rash          Discharge Disposition: Home    Discharge Medications     Discharge Medications    "     New Medications        Instructions Start Date   docusate sodium 100 MG capsule  Commonly known as: Colace   100 mg, Oral, Daily PRN      HYDROcodone-acetaminophen 7.5-325 MG per tablet  Commonly known as: NORCO   1 tablet, Oral, Every 6 Hours PRN      nitrofurantoin (macrocrystal-monohydrate) 100 MG capsule  Commonly known as: Macrobid   100 mg, Oral, 2 Times Daily      polyethylene glycol 17 g packet  Commonly known as: MIRALAX   17 g, Oral, Daily             Continue These Medications        Instructions Start Date   fluticasone 50 MCG/ACT nasal spray  Commonly known as: FLONASE   2 sprays, Daily      glucosamine-chondroitin 500-400 MG capsule capsule   2 capsules, Oral, Daily      loratadine 10 MG tablet  Commonly known as: CLARITIN   10 mg, Daily      nabumetone 750 MG tablet  Commonly known as: RELAFEN   750 mg, Oral, 2 Times Daily      NON FORMULARY   Nitric oxide-  2 tablets daily       valsartan 160 MG tablet  Commonly known as: DIOVAN   160 mg, Daily               Discharge Diet: Soft, bland diet    Activity at Discharge: ambulate    Follow-up Appointments  Dr. Bustos per his orders       BRY Medeiros  10/30/24  11:39 EDT

## 2024-10-30 NOTE — PLAN OF CARE
Goal Outcome Evaluation:  Plan of Care Reviewed With: patient   - VSS, 4L NC, oxygen dropped to 69% on RA, A&Ox4  - PRNs given for pain  - Pt not tolerating ambulating in hallways very well. Walked 100 feet and was fatigued and short of breath. Walked another 200 feet with an a  - Fall precautions in place  - Refusing FSBG. MD aware and education provided.   - IV abx administered  - No other complaints at this time     Progress: no change

## 2024-10-30 NOTE — PLAN OF CARE
Goal Outcome Evaluation:  -AOx4   -Brother came to visit [facility] as was able to help him become more awake and arousable.   -BP was elevated when he came back from PACU.  -[facility] couldn't get him to wake up long enough to take oral medication.  -I was able to give him his oral bp medication and a dose of IV labatolol and his bp came down to 146/85.  -He said that he would like to talk to someone about his BP medication because he states that he has noticed since starting valsartan that he has more oral secretions that he seems to choke on more when sleeping and would like to speak to someone about recommendations.  -He stated he has passed flatus around 10pm.  -advanced his diet as tolerated per order.  -He states since he had a turkey sandwich his nausea seems to be feeling better.  -taken PRN oral pain and nausea medication.  -Highest he has rated his pain tonight was 5/10.  -Fall precaution in place.  -Nursing staff will continue to monitor.

## 2024-10-31 PROCEDURE — 25010000002 SODIUM CHLORIDE 0.9 % WITH KCL 20 MEQ 20-0.9 MEQ/L-% SOLUTION: Performed by: UROLOGY

## 2024-10-31 PROCEDURE — 25010000002 ENOXAPARIN PER 10 MG: Performed by: UROLOGY

## 2024-10-31 PROCEDURE — 25010000002 FUROSEMIDE PER 20 MG: Performed by: INTERNAL MEDICINE

## 2024-10-31 PROCEDURE — 25010000002 ONDANSETRON PER 1 MG: Performed by: UROLOGY

## 2024-10-31 PROCEDURE — 63710000001 ONDANSETRON ODT 4 MG TABLET DISPERSIBLE: Performed by: UROLOGY

## 2024-10-31 RX ORDER — FLUTICASONE PROPIONATE 50 MCG
2 SPRAY, SUSPENSION (ML) NASAL DAILY
Status: DISCONTINUED | OUTPATIENT
Start: 2024-10-31 | End: 2024-11-01 | Stop reason: HOSPADM

## 2024-10-31 RX ORDER — FUROSEMIDE 10 MG/ML
20 INJECTION INTRAMUSCULAR; INTRAVENOUS ONCE
Status: COMPLETED | OUTPATIENT
Start: 2024-10-31 | End: 2024-10-31

## 2024-10-31 RX ADMIN — ENOXAPARIN SODIUM 40 MG: 100 INJECTION SUBCUTANEOUS at 08:22

## 2024-10-31 RX ADMIN — FUROSEMIDE 20 MG: 10 INJECTION, SOLUTION INTRAMUSCULAR; INTRAVENOUS at 09:32

## 2024-10-31 RX ADMIN — PANTOPRAZOLE SODIUM 40 MG: 40 TABLET, DELAYED RELEASE ORAL at 05:34

## 2024-10-31 RX ADMIN — OXYCODONE HYDROCHLORIDE 5 MG: 5 TABLET ORAL at 18:02

## 2024-10-31 RX ADMIN — ONDANSETRON 4 MG: 2 INJECTION INTRAMUSCULAR; INTRAVENOUS at 18:02

## 2024-10-31 RX ADMIN — GABAPENTIN 100 MG: 100 CAPSULE ORAL at 08:22

## 2024-10-31 RX ADMIN — ONDANSETRON 4 MG: 4 TABLET, ORALLY DISINTEGRATING ORAL at 11:56

## 2024-10-31 RX ADMIN — ACETAMINOPHEN 650 MG: 325 TABLET ORAL at 11:56

## 2024-10-31 RX ADMIN — OXYCODONE HYDROCHLORIDE 5 MG: 5 TABLET ORAL at 01:30

## 2024-10-31 RX ADMIN — ACETAMINOPHEN 650 MG: 325 TABLET ORAL at 05:34

## 2024-10-31 RX ADMIN — OXYCODONE HYDROCHLORIDE 5 MG: 5 TABLET ORAL at 21:58

## 2024-10-31 RX ADMIN — ENOXAPARIN SODIUM 40 MG: 100 INJECTION SUBCUTANEOUS at 20:21

## 2024-10-31 RX ADMIN — POTASSIUM CHLORIDE AND SODIUM CHLORIDE 100 ML/HR: 900; 150 INJECTION, SOLUTION INTRAVENOUS at 03:18

## 2024-10-31 RX ADMIN — CETIRIZINE HYDROCHLORIDE 5 MG: 10 TABLET, FILM COATED ORAL at 08:22

## 2024-10-31 RX ADMIN — FLUTICASONE PROPIONATE 2 SPRAY: 50 SPRAY, METERED NASAL at 21:58

## 2024-10-31 RX ADMIN — VALSARTAN 160 MG: 160 TABLET, FILM COATED ORAL at 08:22

## 2024-10-31 RX ADMIN — OXYCODONE HYDROCHLORIDE 5 MG: 5 TABLET ORAL at 05:34

## 2024-10-31 NOTE — PROGRESS NOTES
"IM progress note      Moses Wiseman  [id number]  1965     LOS: 0 days     Attending: Alex Bustos Jr*    Primary Care Provider: Andrea Aragon DO      Chief Complaint/Reason for visit:  Abd pain    Subjective   Doing well with fair pain control.  Weaned to 2 L nasal cannula oxygen sats low 90s.  Ambulated with a walker a good distance.  Tolerated p.o. diet.    Objective          Visit Vitals  /93   Pulse 79   Temp 98.8 °F (37.1 °C) (Oral)   Resp 20   Ht 182.9 cm (72\")   Wt (!) 150 kg (330 lb)   SpO2 94%   BMI 44.76 kg/m²     Temp (24hrs), Av.7 °F (37.1 °C), Min:98.4 °F (36.9 °C), Max:98.9 °F (37.2 °C)      Nutrition: PO    Respiratory: RA    Physical Exam:     General Appearance:    Alert, cooperative, in no acute distress   Head:    Normocephalic, without obvious abnormality, atraumatic    Lungs:     Normal effort, symmetric chest rise, no crepitus, clear to      auscultation bilaterally             Heart:    Regular rhythm and normal rate, normal S1 and S2   Abdomen:     Normal bowel sounds, no masses, no organomegaly, soft        non-tender, non-distended, no guarding, no rebound                tenderness   Extremities:   Soft, benign, clean incisions. KATINA drain.  Obese    Pulses:   Pulses palpable and equal bilaterally   Skin:   No bleeding, bruising or rash   Neurologic:   Moves all extremities with no obvious focal motor deficit.  Cranial nerves 2 - 12 grossly intact     Results Review:     I reviewed the patient's new clinical results.   Results from last 7 days   Lab Units 10/30/24  1028   WBC 10*3/mm3 9.44   HEMOGLOBIN g/dL 12.7*   HEMATOCRIT % 42.1   PLATELETS 10*3/mm3 302     Results from last 7 days   Lab Units 10/30/24  1028   SODIUM mmol/L 138   POTASSIUM mmol/L 4.5   CHLORIDE mmol/L 101   CO2 mmol/L 28.0   BUN mg/dL 11   CREATININE mg/dL 0.90   CALCIUM mg/dL 8.5*   GLUCOSE mg/dL 116*     I reviewed the patient's new imaging including images and " reports.    All medications reviewed.   cetirizine, 5 mg, Oral, Daily  enoxaparin, 40 mg, Subcutaneous, Q12H  fluticasone, 2 spray, Each Nare, Daily  insulin lispro, 2-7 Units, Subcutaneous, 4x Daily AC & at Bedtime  pantoprazole, 40 mg, Oral, Q AM  valsartan, 160 mg, Oral, Daily        Assessment & Plan     S/P prostatectomy    Prostate cancer    Morbid obesity    HTN (hypertension)    ROBIN (obstructive sleep apnea), intolerant of CPAP      Plan  1. Ambulation, several times daily  2. Pain control-prns   3. IS-encouraged  4. DVT proph- mechs/lovenox  5. Bowel regimen  6. Regular diet.  Stopped IV fluids, 1 dose Lasix today.  7. Monitor post-op labs  8. DC planning for home     - Hypertension:  Resume home medications as appropriate, formulary substitution when indicated.  Holding parameters.  PRN medications for elevated blood pressure.     -ROBIN:  Ordered CPAP.  Encouraged patient to use tonight  Monitor O2 sats.  Trying to wean off of nasal cannula oxygen with the use of incentive spirometer, CPAP.  Will arrange home oxygen for short period of time if unable to wean by tomorrow    -Prediabetes       Odalys Doyle MD  10/31/24  18:52 EDT

## 2024-10-31 NOTE — CASE MANAGEMENT/SOCIAL WORK
Continued Stay Note   [facility]     Patient Name: Moses Wiseman  MRN: [id number]  Today's Date: 10/31/2024    Admit Date: 10/29/2024    Plan: Home   Discharge Plan       Row Name 10/31/24 1524       Plan    Plan Home    Patient/Family in Agreement with Plan yes    Plan Comments Spoke with patient at bedside about requirements for home O2 if he is to need it. Pt currently doesnt have a qualifing DX and will have a cost of $100 monthly. Pt encouraged to use Bipap over night as he states he has not been. Will also encourage IS while awake. Order bariatric RW for pt that will come in am as he plans to stay another night. CM will cont to follow for need for O2 at discharge.    Final Discharge Disposition Code 01 - home or self-care                   Discharge Codes    No documentation.                 Expected Discharge Date and Time       Expected Discharge Date Expected Discharge Time    Oct 31, 2024               Ale Dias RN

## 2024-10-31 NOTE — PROGRESS NOTES
Patient is doing well this morning     Pain well-controlled     Reports flatus    Patient is afraid that he will be unable to get up out of bed at home without significant assistance and prefers to stay in the hospital for physical therapy today     Abdomen is benign and incisions look okay  Urine is yellow clear in catheter tubing     KATINA drain output appropriate           Likely home later today or tomorrow if he continues to have reliable bowel function     Home with catheter     KATINA drain out prior to discharge     He will follow-up with me in 10 days for potential trial of void/cystogram

## 2024-10-31 NOTE — PLAN OF CARE
Goal Outcome Evaluation:  AOx4  He doesn't wear oxygen at home and currently he is on 4L NC.  Walked in hallway 450ft last night with oxygen. He was really fatigued and had increased pain afterwards.  Physical therapy consult in for the morning.  Fall precaution in place.  Nursing staff will continue to monitor

## 2024-11-01 VITALS
OXYGEN SATURATION: 89 % | DIASTOLIC BLOOD PRESSURE: 101 MMHG | HEIGHT: 72 IN | HEART RATE: 103 BPM | SYSTOLIC BLOOD PRESSURE: 170 MMHG | RESPIRATION RATE: 20 BRPM | BODY MASS INDEX: 42.66 KG/M2 | TEMPERATURE: 99.2 F | WEIGHT: 315 LBS

## 2024-11-01 LAB
CYTO UR: NORMAL
LAB AP CASE REPORT: NORMAL
LAB AP CLINICAL INFORMATION: NORMAL
PATH REPORT.FINAL DX SPEC: NORMAL
PATH REPORT.GROSS SPEC: NORMAL

## 2024-11-01 PROCEDURE — 25010000002 ENOXAPARIN PER 10 MG: Performed by: UROLOGY

## 2024-11-01 RX ADMIN — VALSARTAN 160 MG: 160 TABLET, FILM COATED ORAL at 11:12

## 2024-11-01 RX ADMIN — CETIRIZINE HYDROCHLORIDE 5 MG: 10 TABLET, FILM COATED ORAL at 08:45

## 2024-11-01 RX ADMIN — Medication 10 MG: at 06:15

## 2024-11-01 RX ADMIN — PANTOPRAZOLE SODIUM 40 MG: 40 TABLET, DELAYED RELEASE ORAL at 05:10

## 2024-11-01 RX ADMIN — ENOXAPARIN SODIUM 40 MG: 100 INJECTION SUBCUTANEOUS at 08:44

## 2024-11-01 RX ADMIN — FLUTICASONE PROPIONATE 2 SPRAY: 50 SPRAY, METERED NASAL at 08:45

## 2024-11-01 NOTE — PLAN OF CARE
Goal Outcome Evaluation:  AOx4  He is currently on 2L NC stat at 92%  He wore his CPAP aprox about 1 hour tonight because he said he couldn't handle the pressure.  He walked 450ft with gait belt and walker.  KATINA drain dressing this morning was completely soiled.    Fall precautions in place.  Nursing staff will continue to monitor

## 2024-11-01 NOTE — PLAN OF CARE
Problem: Adult Inpatient Plan of Care  Goal: Plan of Care Review  Outcome: Progressing  Flowsheets (Taken 10/31/2024 1957)  Outcome Evaluation: VSS. A&O but has slep alot off an on today. Continues to require 2L NC, unable to wean to RA. Pt c/o headache some today (pt states he is prone to HAs) and mild nausea (no emesis). Pt able to tolerate sandwich at lunch time. Ambulated in hallway with staff and a walker. Increased activity encouarged. CPAP use tonight encouarged. Pt reports he did not wear CPAP for very long last night.  Plan of Care Reviewed With: patient  Goal: Patient-Specific Goal (Individualized)  Outcome: Progressing  Goal: Absence of Hospital-Acquired Illness or Injury  Outcome: Progressing  Intervention: Identify and Manage Fall Risk  Recent Flowsheet Documentation  Taken 10/31/2024 1800 by Yue Glover RN  Safety Promotion/Fall Prevention:   activity supervised   fall prevention program maintained   nonskid shoes/slippers when out of bed   safety round/check completed  Taken 10/31/2024 1600 by Yue Glover RN  Safety Promotion/Fall Prevention:   activity supervised   fall prevention program maintained   nonskid shoes/slippers when out of bed   safety round/check completed  Taken 10/31/2024 1400 by Yue Glover RN  Safety Promotion/Fall Prevention:   activity supervised   fall prevention program maintained   nonskid shoes/slippers when out of bed   safety round/check completed  Taken 10/31/2024 1200 by Yue Glover RN  Safety Promotion/Fall Prevention:   activity supervised   fall prevention program maintained   nonskid shoes/slippers when out of bed   safety round/check completed  Taken 10/31/2024 1000 by Yue Glover RN  Safety Promotion/Fall Prevention:   activity supervised   fall prevention program maintained   nonskid shoes/slippers when out of bed   safety round/check completed  Taken 10/31/2024 0800 by Yue Glover RN  Safety Promotion/Fall Prevention:   activity supervised    fall prevention program maintained   nonskid shoes/slippers when out of bed   safety round/check completed  Intervention: Prevent Skin Injury  Recent Flowsheet Documentation  Taken 10/31/2024 1800 by Yue Glover RN  Body Position: position changed independently  Taken 10/31/2024 1600 by Yue Glover RN  Body Position: position changed independently  Taken 10/31/2024 1400 by Yue Glover RN  Body Position: supine  Taken 10/31/2024 1200 by Yue Glover RN  Body Position: sitting up in bed  Taken 10/31/2024 1000 by Yue Glover RN  Body Position: sitting up in bed  Taken 10/31/2024 0800 by Yue Glover RN  Body Position: supine  Intervention: Prevent and Manage VTE (Venous Thromboembolism) Risk  Recent Flowsheet Documentation  Taken 10/31/2024 1200 by Yue Glover RN  VTE Prevention/Management:   bilateral   SCDs (sequential compression devices) on  Taken 10/31/2024 0800 by Yue Glover RN  VTE Prevention/Management:   bilateral   SCDs (sequential compression devices) on  Goal: Optimal Comfort and Wellbeing  Outcome: Progressing  Intervention: Monitor Pain and Promote Comfort  Recent Flowsheet Documentation  Taken 10/31/2024 1800 by Yue Glover RN  Pain Management Interventions: pain medication given  Taken 10/31/2024 1600 by Yue Glover RN  Pain Management Interventions: (states narcotics make his nauseated) medication offered but refused  Taken 10/31/2024 1200 by Yue Glover RN  Pain Management Interventions: pain medication given  Intervention: Provide Person-Centered Care  Recent Flowsheet Documentation  Taken 10/31/2024 0800 by Yue Glover RN  Trust Relationship/Rapport:   care explained   thoughts/feelings acknowledged  Goal: Readiness for Transition of Care  Outcome: Progressing     Problem: Skin Injury Risk Increased  Goal: Skin Health and Integrity  Outcome: Progressing  Intervention: Optimize Skin Protection  Recent Flowsheet Documentation  Taken 10/31/2024 1800 by Yue Glover  RN  Activity Management: activity encouraged  Head of Bed (HOB) Positioning: HOB elevated  Taken 10/31/2024 1600 by Yue Glover RN  Activity Management: activity encouraged  Head of Bed (HOB) Positioning: HOB at 30 degrees  Taken 10/31/2024 1400 by Yue Glover RN  Activity Management: activity encouraged  Head of Bed (HOB) Positioning: HOB at 30-45 degrees  Taken 10/31/2024 1200 by Yue Glover RN  Activity Management: activity encouraged  Head of Bed (HOB) Positioning: HOB elevated  Taken 10/31/2024 1130 by Yue Glover RN  Activity Management: ambulated outside room  Taken 10/31/2024 1000 by Yue Glover RN  Activity Management: activity encouraged  Head of Bed (HOB) Positioning: HOB elevated  Taken 10/31/2024 0800 by Yue Glover RN  Activity Management: activity encouraged  Head of Bed (HOB) Positioning: HOB elevated     Problem: Comorbidity Management  Goal: Blood Pressure in Desired Range  Outcome: Progressing  Intervention: Maintain Blood Pressure Management  Recent Flowsheet Documentation  Taken 10/31/2024 0800 by Yue Glover RN  Medication Review/Management: medications reviewed     Problem: Fall Injury Risk  Goal: Absence of Fall and Fall-Related Injury  Outcome: Progressing  Intervention: Identify and Manage Contributors  Recent Flowsheet Documentation  Taken 10/31/2024 0800 by Yue Glover RN  Medication Review/Management: medications reviewed  Intervention: Promote Injury-Free Environment  Recent Flowsheet Documentation  Taken 10/31/2024 1800 by Yue Glover RN  Safety Promotion/Fall Prevention:   activity supervised   fall prevention program maintained   nonskid shoes/slippers when out of bed   safety round/check completed  Taken 10/31/2024 1600 by Yue Glover RN  Safety Promotion/Fall Prevention:   activity supervised   fall prevention program maintained   nonskid shoes/slippers when out of bed   safety round/check completed  Taken 10/31/2024 1400 by Yue Glover RN  Safety  Promotion/Fall Prevention:   activity supervised   fall prevention program maintained   nonskid shoes/slippers when out of bed   safety round/check completed  Taken 10/31/2024 1200 by Yue Glover RN  Safety Promotion/Fall Prevention:   activity supervised   fall prevention program maintained   nonskid shoes/slippers when out of bed   safety round/check completed  Taken 10/31/2024 1000 by Yue Glover RN  Safety Promotion/Fall Prevention:   activity supervised   fall prevention program maintained   nonskid shoes/slippers when out of bed   safety round/check completed  Taken 10/31/2024 0800 by Yue Glover RN  Safety Promotion/Fall Prevention:   activity supervised   fall prevention program maintained   nonskid shoes/slippers when out of bed   safety round/check completed     Problem: Noninvasive Ventilation Acute  Goal: Effective Unassisted Ventilation and Oxygenation  Outcome: Progressing   Goal Outcome Evaluation:  Plan of Care Reviewed With: patient           Outcome Evaluation: VSS. A&O but has slep alot off an on today. Continues to require 2L NC, unable to wean to RA. Pt c/o headache some today (pt states he is prone to HAs) and mild nausea (no emesis). Pt able to tolerate sandwich at lunch time. Ambulated in hallway with staff and a walker. Increased activity encouarged. CPAP use tonight encouarged. Pt reports he did not wear CPAP for very long last night.

## 2024-11-01 NOTE — PLAN OF CARE
Problem: Adult Inpatient Plan of Care  Goal: Plan of Care Review  Outcome: Met  Flowsheets (Taken 11/1/2024 1517)  Outcome Evaluation: VSS, A&O. Pt now on RA when awake with O2 sats 92-93%.  He does desat when sleeping. Pt reminded to wear CPAP at night/ when napping once he arrives home. Elevated BP, however pt reports that this is his baseline. Hospitalist informed of elevated BP. Plan to follow up with PCP outpt for HTN management. Provided with walker prior to discharge. Jasmine cath in place, changed to leg bag, provided with overnight bag, watched jasmine care discharge video. KATINA drain removed before discharge.   Plan of Care Reviewed With: patient  Goal: Patient-Specific Goal (Individualized)  Outcome: Met  Goal: Absence of Hospital-Acquired Illness or Injury  Outcome: Met  Intervention: Identify and Manage Fall Risk  Recent Flowsheet Documentation  Taken 11/1/2024 1400 by Yue Glover RN  Safety Promotion/Fall Prevention:   activity supervised   fall prevention program maintained   nonskid shoes/slippers when out of bed   safety round/check completed  Taken 11/1/2024 1200 by Yue Glover RN  Safety Promotion/Fall Prevention:   activity supervised   fall prevention program maintained   nonskid shoes/slippers when out of bed   safety round/check completed  Taken 11/1/2024 1000 by Yue Glover RN  Safety Promotion/Fall Prevention:   activity supervised   fall prevention program maintained   nonskid shoes/slippers when out of bed   safety round/check completed  Taken 11/1/2024 0800 by Yue Glover RN  Safety Promotion/Fall Prevention:   activity supervised   fall prevention program maintained   nonskid shoes/slippers when out of bed   safety round/check completed  Intervention: Prevent Skin Injury  Recent Flowsheet Documentation  Taken 11/1/2024 1400 by Yue Glover RN  Body Position: sitting up in bed  Taken 11/1/2024 1200 by Yue Glover RN  Body Position: sitting up in bed  Taken 11/1/2024 1000 by  Yue Glover RN  Body Position: sitting up in bed  Taken 11/1/2024 0800 by Yue Glover RN  Body Position: sitting up in bed  Intervention: Prevent and Manage VTE (Venous Thromboembolism) Risk  Recent Flowsheet Documentation  Taken 11/1/2024 0800 by Yue Glover RN  VTE Prevention/Management:   bilateral   SCDs (sequential compression devices) on  Goal: Optimal Comfort and Wellbeing  Outcome: Met  Intervention: Provide Person-Centered Care  Recent Flowsheet Documentation  Taken 11/1/2024 0800 by Yue Glover RN  Trust Relationship/Rapport:   care explained   thoughts/feelings acknowledged  Goal: Readiness for Transition of Care  Outcome: Met     Problem: Skin Injury Risk Increased  Goal: Skin Health and Integrity  Outcome: Met  Intervention: Optimize Skin Protection  Recent Flowsheet Documentation  Taken 11/1/2024 1400 by Yue Glover RN  Activity Management: activity encouraged  Head of Bed (HOB) Positioning: HOB elevated  Taken 11/1/2024 1200 by Yue Glover RN  Activity Management: activity encouraged  Head of Bed (HOB) Positioning: HOB elevated  Taken 11/1/2024 1000 by Yue Glover RN  Activity Management: activity encouraged  Head of Bed (HOB) Positioning: HOB elevated  Taken 11/1/2024 0800 by Yue Glover RN  Activity Management: activity encouraged  Head of Bed (HOB) Positioning: HOB elevated     Problem: Comorbidity Management  Goal: Blood Pressure in Desired Range  Outcome: Met  Intervention: Maintain Blood Pressure Management  Recent Flowsheet Documentation  Taken 11/1/2024 1000 by Yue Glover RN  Medication Review/Management: medications reviewed  Taken 11/1/2024 0800 by Yue Glover RN  Medication Review/Management: medications reviewed     Problem: Fall Injury Risk  Goal: Absence of Fall and Fall-Related Injury  Outcome: Met  Intervention: Identify and Manage Contributors  Recent Flowsheet Documentation  Taken 11/1/2024 1000 by Yue Glover RN  Medication Review/Management:  medications reviewed  Taken 11/1/2024 0800 by Yue Glover RN  Medication Review/Management: medications reviewed  Intervention: Promote Injury-Free Environment  Recent Flowsheet Documentation  Taken 11/1/2024 1400 by Yue Glover RN  Safety Promotion/Fall Prevention:   activity supervised   fall prevention program maintained   nonskid shoes/slippers when out of bed   safety round/check completed  Taken 11/1/2024 1200 by Yue Glover RN  Safety Promotion/Fall Prevention:   activity supervised   fall prevention program maintained   nonskid shoes/slippers when out of bed   safety round/check completed  Taken 11/1/2024 1000 by Yue Glover RN  Safety Promotion/Fall Prevention:   activity supervised   fall prevention program maintained   nonskid shoes/slippers when out of bed   safety round/check completed  Taken 11/1/2024 0800 by Yue Glover RN  Safety Promotion/Fall Prevention:   activity supervised   fall prevention program maintained   nonskid shoes/slippers when out of bed   safety round/check completed     Problem: Noninvasive Ventilation Acute  Goal: Effective Unassisted Ventilation and Oxygenation  Outcome: Met   Goal Outcome Evaluation:  Plan of Care Reviewed With: patient           Outcome Evaluation: VSS, A&O. Pt now on RA when awake with O2 sats 92-93%.  He does desat when sleeping. Pt reminded to wear CPAP at night/ when napping once he arrives home. Elevated BP, however pt reports that this is his baseline. Hospitalist informed of elevated BP. Plan to follow up with PCP outpt for HTN management. Provided with walker prior to discharge. Jasmine cath in place, changed to leg bag, provided with overnight bag, watched jasmine care discharge video.

## 2024-11-01 NOTE — DISCHARGE SUMMARY
Patient Name: Moses Wiseman  MRN: [id number]  : 1965  DOS: 2024    Attending: Alex Bustos Jr*    Primary Care Provider: Andrea Aragon DO    Date of Admission:.10/29/2024  9:16 AM    Date of Discharge:  2024    Discharge Diagnosis:   S/P prostatectomy    Prostate cancer    Morbid obesity    HTN (hypertension)    ROBIN (obstructive sleep apnea), intolerant of CPAP      Hospital Course    At admit:    Patient is a pleasant 59 y.o. male presented for scheduled surgery by Dr. Juli Bishop.     Per his note (59-year-old gentleman with localized prostate cancer is opted for surgical therapy understanding risk benefits and alternatives. He gives his full consent for the above named procedure.).     Noting that patient had a prostate biopsy on 3/29/2024 due to elevated PSA level 219.  Was found to have prostate CA Anh 4+3 = 7.     I saw him preoperatively and reviewed with patient past medical history and medications.     Subsequent notes indicate he underwent robotic prostatectomy with lymph nodes under general anesthesia block, tolerated surgery well, was admitted for further management.     I stopped by to see him after surgery he was sound asleep with nasal cannula oxygen.  Discussed with RN.  Patient has requested CPAP, ordered.    After admit:    He was provided pain medication as needed for pain control, he received DVT prophylaxis with mechanicals, and Lovenox.    Adjustments were made to pain medications to optimize postop pain management. Risks and benefits of opiate medications discussed with patient.  Hai report in chart was reviewed prior to discharge.    His home medications were resumed as appropriate, he was started on clear liquid diet which was tolerated and advanced to regular diet .    He required NC oxygen to maintain adequate oxygenation initially, used CPAP very little, used IS, and was weaned off O2 during the day.  He was strongly advised to use  "his CPAP upon return home.    His blood pressure was observed to be elevated during his stay, he apparently runs high blood pressure at home and will require adjustment to his regimen for which she will follow-up with his PCP    He was provided a bowel regimen and was encouraged to ambulate frequently which he did.    During his stay he had evidence of bowel function return and he tolerated his by mouth diet well.    He had a KATINA drain postoperatively which has since been removed.      He continues to have a Terry catheter in place which he will keep attached to a leg bag until his follow-up appointment with urology.    When I saw him today he is doing quite well and he is ready for discharge home.      Procedures Performed    10/29/2024     Pre-op Diagnosis:   Prostate cancer     Post-op Diagnosis:     Prostate cancer    Procedure(s):  ROBOTIC PROSTATECTOMY WITH NODES     Surgeon(s):  Alex Bustos Jr., MD    Pertinent Test Results:    I reviewed the patient's new clinical results.   Results from last 7 days   Lab Units 10/30/24  1028   WBC 10*3/mm3 9.44   HEMOGLOBIN g/dL 12.7*   HEMATOCRIT % 42.1   PLATELETS 10*3/mm3 302     Results from last 7 days   Lab Units 10/30/24  1028   SODIUM mmol/L 138   POTASSIUM mmol/L 4.5   CHLORIDE mmol/L 101   CO2 mmol/L 28.0   BUN mg/dL 11   CREATININE mg/dL 0.90   CALCIUM mg/dL 8.5*   GLUCOSE mg/dL 116*     I reviewed the patient's new imaging including images and reports.      Discharge Assessment:    Vital Signs 170/101 blood pressure  Visit Vitals  BP (!) 167/112 (BP Location: Left arm, Patient Position: Lying)   Pulse 72   Temp 99.2 °F (37.3 °C)   Resp 20   Ht 182.9 cm (72\")   Wt (!) 150 kg (330 lb)   SpO2 94%   BMI 44.76 kg/m²     Temp (24hrs), Av.6 °F (37 °C), Min:98 °F (36.7 °C), Max:99.2 °F (37.3 °C)      General Appearance:    Alert, cooperative, in no acute distress   Lungs:     Clear to auscultation,respirations regular, even and unlabored    Heart:    " Regular rhythm and normal rate, normal S1 and S2    Abdomen:   Soft, benign, clean incisions.  KATINA drain, out prior to discharge.  : Terry with yellow urine   Extremities:   Moves all extremities well, no edema, no cyanosis, no redness   Pulses:   Pulses palpable and equal bilaterally   Skin:   No bleeding, bruising or rash          Discharge Disposition: Home          Discharge Medications        New Medications        Instructions Start Date   docusate sodium 100 MG capsule  Commonly known as: Colace   100 mg, Oral, Daily PRN      HYDROcodone-acetaminophen 7.5-325 MG per tablet  Commonly known as: NORCO   1 tablet, Oral, Every 6 Hours PRN      nitrofurantoin (macrocrystal-monohydrate) 100 MG capsule  Commonly known as: Macrobid   100 mg, Oral, 2 Times Daily      polyethylene glycol 17 g packet  Commonly known as: MIRALAX   17 g, Oral, Daily             Continue These Medications        Instructions Start Date   fluticasone 50 MCG/ACT nasal spray  Commonly known as: FLONASE   2 sprays, Daily      glucosamine-chondroitin 500-400 MG capsule capsule   2 capsules, Oral, Daily      loratadine 10 MG tablet  Commonly known as: CLARITIN   10 mg, Daily      nabumetone 750 MG tablet  Commonly known as: RELAFEN   750 mg, Oral, 2 Times Daily      NON FORMULARY   Nitric oxide-  2 tablets daily       valsartan 160 MG tablet  Commonly known as: DIOVAN   160 mg, Daily               Discharge Diet: Soft, bland diet    Activity at Discharge: ambulate    Follow-up Appointments  Dr. Juli Bishop per his orders       Odalys Doyle MD  11/01/24  10:02 EDT

## (undated) DEVICE — BLANKT WARM UPPR/BDY ARM/OUT 57X196CM

## (undated) DEVICE — TROC BLADLES ANCHORPORT/OPTI LP 12X120MM 1P/U

## (undated) DEVICE — PENCL SMOKE/EVAC MEGADYNE TELESCP 10FT

## (undated) DEVICE — SUT SILK 2/0 PS 18IN 1588H

## (undated) DEVICE — ST TBG AIRSEAL FLTR TRI LUM

## (undated) DEVICE — GLV SURG SENSICARE PI MIC PF SZ7.5 LF STRL

## (undated) DEVICE — CATH FOL BARDEX 2WY 20F 30CC

## (undated) DEVICE — TIP COVER ACCESSORY

## (undated) DEVICE — ANTIBACTERIAL UNDYED BRAIDED (POLYGLACTIN 910), SYNTHETIC ABSORBABLE SUTURE: Brand: COATED VICRYL

## (undated) DEVICE — VIOLET POLYDIOXANONE POLYMER, SYNTHETIC ABSORBABLE SUTURE CLIPS: Brand: LAPRA-TY

## (undated) DEVICE — PATIENT RETURN ELECTRODE, SINGLE-USE, CONTACT QUALITY MONITORING, ADULT, WITH 9FT CORD, FOR PATIENTS WEIGING OVER 33LBS. (15KG): Brand: MEGADYNE

## (undated) DEVICE — ENDOPATH XCEL BLADELESS TROCARS WITH STABILITY SLEEVES: Brand: ENDOPATH XCEL

## (undated) DEVICE — SUT MNCRYL UROLOGICAL UR6 2/0 27IN Y605H

## (undated) DEVICE — ARM DRAPE

## (undated) DEVICE — GOWN,NON-REINFORCED,SIRUS,SET IN SLV,XL: Brand: MEDLINE

## (undated) DEVICE — SUT VIC 0 TIES 18IN J912G

## (undated) DEVICE — SEAL

## (undated) DEVICE — LAPAROVUE VISIBILITY SYSTEM LAPAROSCOPIC SOLUTIONS: Brand: LAPAROVUE

## (undated) DEVICE — CATH FOL BARDEX 2WY 18F 5CC

## (undated) DEVICE — VESSEL SEALER EXTEND: Brand: ENDOWRIST

## (undated) DEVICE — DRSNG WND BORDR/ADHS NONADHR/GZ LF 2X2IN STRL

## (undated) DEVICE — BLADELESS OBTURATOR: Brand: WECK VISTA

## (undated) DEVICE — COLUMN DRAPE

## (undated) DEVICE — SEALANT WND FIBRIN TISSEEL PREFIL/SYR/PRIMAFZ 4ML

## (undated) DEVICE — LEX DAVINCI PROSTATE: Brand: MEDLINE INDUSTRIES, INC.

## (undated) DEVICE — SUT MNCRYL UROLOG UR6 2/0 27IN UNDYED

## (undated) DEVICE — SUT PDS MONO 0 36 CT1 VIL PDP346H

## (undated) DEVICE — ENDOPOUCH RETRIEVER SPECIMEN RETRIEVAL BAGS: Brand: ENDOPOUCH RETRIEVER